# Patient Record
Sex: MALE | Race: WHITE | Employment: PART TIME | URBAN - METROPOLITAN AREA
[De-identification: names, ages, dates, MRNs, and addresses within clinical notes are randomized per-mention and may not be internally consistent; named-entity substitution may affect disease eponyms.]

---
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
## 2018-01-18 ENCOUNTER — OFFICE VISIT (OUTPATIENT)
Dept: FAMILY MEDICINE | Facility: CLINIC | Age: 18
End: 2018-01-18
Payer: COMMERCIAL

## 2018-01-18 VITALS
SYSTOLIC BLOOD PRESSURE: 113 MMHG | WEIGHT: 150 LBS | OXYGEN SATURATION: 100 % | TEMPERATURE: 98.9 F | BODY MASS INDEX: 17.36 KG/M2 | HEART RATE: 63 BPM | DIASTOLIC BLOOD PRESSURE: 73 MMHG | HEIGHT: 78 IN

## 2018-01-18 DIAGNOSIS — E55.9 VITAMIN D DEFICIENCY: ICD-10-CM

## 2018-01-18 DIAGNOSIS — Z00.00 ROUTINE GENERAL MEDICAL EXAMINATION AT A HEALTH CARE FACILITY: Primary | ICD-10-CM

## 2018-01-18 DIAGNOSIS — Z23 NEED FOR PROPHYLACTIC VACCINATION AND INOCULATION AGAINST INFLUENZA: ICD-10-CM

## 2018-01-18 DIAGNOSIS — E03.8 SUBCLINICAL HYPOTHYROIDISM: ICD-10-CM

## 2018-01-18 LAB
T4 FREE SERPL-MCNC: 1.22 NG/DL (ref 0.76–1.46)
TSH SERPL DL<=0.005 MIU/L-ACNC: 4.01 MU/L (ref 0.4–4)

## 2018-01-18 PROCEDURE — 84439 ASSAY OF FREE THYROXINE: CPT | Performed by: FAMILY MEDICINE

## 2018-01-18 PROCEDURE — 84443 ASSAY THYROID STIM HORMONE: CPT | Performed by: FAMILY MEDICINE

## 2018-01-18 PROCEDURE — 90686 IIV4 VACC NO PRSV 0.5 ML IM: CPT | Performed by: FAMILY MEDICINE

## 2018-01-18 PROCEDURE — 90471 IMMUNIZATION ADMIN: CPT | Performed by: FAMILY MEDICINE

## 2018-01-18 PROCEDURE — 36415 COLL VENOUS BLD VENIPUNCTURE: CPT | Performed by: FAMILY MEDICINE

## 2018-01-18 PROCEDURE — 99395 PREV VISIT EST AGE 18-39: CPT | Mod: 25 | Performed by: FAMILY MEDICINE

## 2018-01-18 PROCEDURE — 82306 VITAMIN D 25 HYDROXY: CPT | Performed by: FAMILY MEDICINE

## 2018-01-18 ASSESSMENT — ANXIETY QUESTIONNAIRES
3. WORRYING TOO MUCH ABOUT DIFFERENT THINGS: SEVERAL DAYS
1. FEELING NERVOUS, ANXIOUS, OR ON EDGE: SEVERAL DAYS
7. FEELING AFRAID AS IF SOMETHING AWFUL MIGHT HAPPEN: MORE THAN HALF THE DAYS
6. BECOMING EASILY ANNOYED OR IRRITABLE: MORE THAN HALF THE DAYS
5. BEING SO RESTLESS THAT IT IS HARD TO SIT STILL: SEVERAL DAYS
2. NOT BEING ABLE TO STOP OR CONTROL WORRYING: SEVERAL DAYS

## 2018-01-18 ASSESSMENT — PATIENT HEALTH QUESTIONNAIRE - PHQ9: SUM OF ALL RESPONSES TO PHQ QUESTIONS 1-9: 11

## 2018-01-18 NOTE — MR AVS SNAPSHOT
After Visit Summary   1/18/2018    Suraj Hawthorne    MRN: [id number]           Patient Information     Date Of Birth          2000        Visit Information        Provider Department      1/18/2018 3:00 PM Gopal Lombardo MD [facility]        Today's Diagnoses     Routine general medical examination at a health care facility    -  1    Vitamin D deficiency        Subclinical hypothyroidism        Need for prophylactic vaccination and inoculation against influenza          Care Instructions      Preventive Health Recommendations  Male Ages 18 - 25     Yearly exam:             See your health care provider every year in order to  o   Review health changes.   o   Discuss preventive care.    o   Review your medicines if your doctor has prescribed any.    You should be tested each year for STDs (sexually transmitted diseases).     Talk to your provider about cholesterol testing.      If you are at risk for diabetes, you should have a diabetes test (fasting glucose).    Shots: Get a flu shot each year. Get a tetanus shot every 10 years.     Nutrition:    Eat at least 5 servings of fruits and vegetables daily.     Eat whole-grain bread, whole-wheat pasta and brown rice instead of white grains and rice.     Talk to your provider about calcium and Vitamin D.     Lifestyle    Exercise for at least 150 minutes a week (30 minutes a day, 5 days a week). This will help you control your weight and prevent disease.     Limit alcohol to one drink per day.     No smoking.     Wear sunscreen to prevent skin cancer.     See your dentist every six months for an exam and cleaning.     Testicular Self-Exam (LIZETTE)  Testicular cancer is the most common form of cancer in men between the ages of 15 and 35. Most cases affect men under 55. It usually shows up as a painless lump in the testicle. The good news is that a simple monthly self-exam may help find trouble before it gets serious. When detected early,  testicular cancer is almost 100% curable.       Doing your LIZETTE  Do a LIZETTE once a month, during or after a warm shower. Spend about 3 minutes to 5 minutes feeling for lumps, firm areas, or changes. If you do find a problem, don t panic. Call your doctor and make an appointment.  Check the testicles  Hold your scrotum in the palm of your hand. Roll each testicle gently between the thumbs and fingers of both hands. Feel for changes in each testicle, one at a time.  Check the epididymis  The epididymis is a raised, rim-like structure responsible for sperm storage. It runs along the top and back of each testicle and often hurts when you press on it. Gently feel each epididymis for changes. A spermatocele, which is a cyst, can present as a painless growth near the testicle. These are noncancerous.  Check the vas deferens  The vas deferens is a little tube that runs up from the top of each testicle. A normal vas feels like a firm piece of cooked spaghetti. Feel for changes in the vas above each testicle.  Professional screening  If you feel any abnormalities, tell your doctor right away. In addition to doing your own LIZETTE, you should also see your doctor for regular checkups.  Date Last Reviewed: 1/1/2017 2000-2017 [employer]. [address] [place] [address]. All rights reserved. This information is not intended as a substitute for professional medical care. Always follow your healthcare professional's instructions.                Follow-ups after your visit        Follow-up notes from your care team     Return in about 1 year (around 1/18/2019) for Physical Exam.      Who to contact     If you have questions or need follow up information about today's clinic visit or your schedule please contact [facility] directly at [phone].  Normal or non-critical lab and imaging results will be communicated to you by MyChart, letter or phone within 4 business days after the clinic has  "received the results. If you do not hear from us within 7 days, please contact the clinic through [employer] or phone. If you have a critical or abnormal lab result, we will notify you by phone as soon as possible.  Submit refill requests through [employer] or call your pharmacy and they will forward the refill request to us. Please allow 3 business days for your refill to be completed.          Additional Information About Your Visit        [employer] Information     [employer] lets you send messages to your doctor, view your test results, renew your prescriptions, schedule appointments and more. To sign up, go to [email or web address] . Click on \"Log in\" on the left side of the screen, which will take you to the Welcome page. Then click on \"Sign up Now\" on the right side of the page.     You will be asked to enter the access code listed below, as well as some personal information. Please follow the directions to create your username and password.     Your access code is: [email or web address]  Expires: 2018 10:36 AM     Your access code will  in 90 days. If you need help or a new code, please call your [place] clinic or [phone].        Care EveryWhere ID     This is your Care EveryWhere ID. This could be used by other organizations to access your [place] medical records  [id number]        Your Vitals Were     Pulse Temperature Height Pulse Oximetry BMI (Body Mass Index)       63 98.9  F (37.2  C) (Oral) 6' 6\" (1.981 m) 100% 17.33 kg/m2        Blood Pressure from Last 3 Encounters:   18 113/73   17 112/76   17 106/65    Weight from Last 3 Encounters:   18 150 lb (68 kg) (45 %)*   17 153 lb (69.4 kg) (51 %)*   17 157 lb (71.2 kg) (61 %)*     * Growth percentiles are based on CDC 2-20 Years data.              We Performed the Following     25 Hydroxyvitamin D2 and D3     FLU VAC, SPLIT VIRUS IM > 3 YO (QUADRIVALENT) [71437]     TSH with free T4 reflex     Vaccine Administration, " Initial [71568]        Primary Care Provider Office Phone # Fax #    Gopal Lombardo [phone] [address] [facility] [address]        Equal Access to Services     ANGELA JEFFERS : Sonia richard sommero Soomaali, waaxda luqadaha, qaybta kaalmada adeegyada, chago baker laShainakailey butler. So [facility] [phone].    ATENCIÓN: Si habla español, tiene a flores disposición servicios gratuitos de asistencia lingüística. Llame al [phone].    We comply with applicable federal civil rights laws and [place] laws. We do not discriminate on the basis of race, color, national origin, age, disability, sex, sexual orientation, or gender identity.            Thank you!     Thank you for choosing [facility]  for your care. Our goal is always to provide you with excellent care. Hearing back from our patients is one way we can continue to improve our services. Please take a few minutes to complete the written survey that you may receive in the mail after your visit with us. Thank you!             Your Updated Medication List - Protect others around you: Learn how to safely use, store and throw away your medicines at www.disposemymeds.org.          This list is accurate as of: 1/18/18  4:15 PM.  Always use your most recent med list.                   Brand Name Dispense Instructions for use Diagnosis    celeXA 20 MG tablet   Generic drug:  citalopram      Take 10 mg by mouth daily        Cholecalciferol 4000 UNITS Caps       Vitamin D deficiency       guanFACINE 1 MG tablet    TENEX     Take 0.5 tablets by mouth 2 times daily.        INVEGA 6 MG 24 hr tablet   Generic drug:  paliperidone      Take 3 mg by mouth every morning 3 mg

## 2018-01-18 NOTE — PATIENT INSTRUCTIONS
Preventive Health Recommendations  Male Ages 18 - 25     Yearly exam:             See your health care provider every year in order to  o   Review health changes.   o   Discuss preventive care.    o   Review your medicines if your doctor has prescribed any.    You should be tested each year for STDs (sexually transmitted diseases).     Talk to your provider about cholesterol testing.      If you are at risk for diabetes, you should have a diabetes test (fasting glucose).    Shots: Get a flu shot each year. Get a tetanus shot every 10 years.     Nutrition:    Eat at least 5 servings of fruits and vegetables daily.     Eat whole-grain bread, whole-wheat pasta and brown rice instead of white grains and rice.     Talk to your provider about calcium and Vitamin D.     Lifestyle    Exercise for at least 150 minutes a week (30 minutes a day, 5 days a week). This will help you control your weight and prevent disease.     Limit alcohol to one drink per day.     No smoking.     Wear sunscreen to prevent skin cancer.     See your dentist every six months for an exam and cleaning.     Testicular Self-Exam (LIZETTE)  Testicular cancer is the most common form of cancer in men between the ages of 15 and 35. Most cases affect men under 55. It usually shows up as a painless lump in the testicle. The good news is that a simple monthly self-exam may help find trouble before it gets serious. When detected early, testicular cancer is almost 100% curable.       Doing your LIZETTE  Do a LIZETTE once a month, during or after a warm shower. Spend about 3 minutes to 5 minutes feeling for lumps, firm areas, or changes. If you do find a problem, don t panic. Call your doctor and make an appointment.  Check the testicles  Hold your scrotum in the palm of your hand. Roll each testicle gently between the thumbs and fingers of both hands. Feel for changes in each testicle, one at a time.  Check the epididymis  The epididymis is a raised, rim-like structure  responsible for sperm storage. It runs along the top and back of each testicle and often hurts when you press on it. Gently feel each epididymis for changes. A spermatocele, which is a cyst, can present as a painless growth near the testicle. These are noncancerous.  Check the vas deferens  The vas deferens is a little tube that runs up from the top of each testicle. A normal vas feels like a firm piece of cooked spaghetti. Feel for changes in the vas above each testicle.  Professional screening  If you feel any abnormalities, tell your doctor right away. In addition to doing your own LIZETTE, you should also see your doctor for regular checkups.  Date Last Reviewed: 1/1/2017 2000-2017 [employer]. [address] [place] [address], [place] [address]. All rights reserved. This information is not intended as a substitute for professional medical care. Always follow your healthcare professional's instructions.

## 2018-01-18 NOTE — PROGRESS NOTES

## 2018-01-18 NOTE — PROGRESS NOTES
"SUBJECTIVE:   CC: Suraj Hawthorne is an 17 year old male who presents for preventative health visit.     Physical   Annual:     Getting at least 3 servings of Calcium per day::  Yes    Bi-annual eye exam::  Yes    Dental care twice a year::  Yes    Sleep apnea or symptoms of sleep apnea::  None    Diet::  Regular (no restrictions)    Frequency of exercise::  4-5 days/week    Duration of exercise::  30-45 minutes    Taking medications regularly::  Yes    Medication side effects::  None    Additional concerns today::  No            He graduated from high school  In the spring of 2017.   He is at [employer] and taking classes.     He works out [employer] on a tread mill and does weights as well.   He is an a [employer].   243               Today's PHQ-2 Score: PHQ-2 ( 1999 Pfizer) 1/18/2018   Q1: Little interest or pleasure in doing things 0   Q2: Feeling down, depressed or hopeless 1   PHQ-2 Score 1   Q1: Little interest or pleasure in doing things Not at all   Q2: Feeling down, depressed or hopeless Several days   PHQ-2 Score 1       Abuse: Current or Past(Physical, Sexual or Emotional)- No  Do you feel safe in your environment - Yes    Social History   Substance Use Topics     Smoking status: Never Smoker     Smokeless tobacco: Never Used     Alcohol use No     Alcohol Use 1/18/2018   If you drink alcohol, do you typically have greater than 3 drinks per day OR greater than 7 drinks per week?   No   No flowsheet data found.      Last PSA: No results found for: PSA    Reviewed orders with patient. Reviewed health maintenance and updated orders accordingly -       Reviewed and updated as needed this visit by clinical staff  Tobacco  Allergies  Meds  Med Hx  Surg Hx  Fam Hx  Soc Hx        Reviewed and updated as needed this visit by Provider            Review of Systems      OBJECTIVE:   /73  Pulse 63  Temp 98.9  F (37.2  C) (Oral)  Ht 6' 6\" (1.981 m)  Wt 150 lb (68 kg)  SpO2 100%  BMI 17.33 " "kg/m2    Physical Exam      ASSESSMENT/PLAN:       ICD-10-CM    1. Routine general medical examination at a health care facility Z00.00        COUNSELING:   Reviewed preventive health counseling, as reflected in patient instructions       Regular exercise       Healthy diet/nutrition       Vision screening       Immunizations    Vaccinated for: Influenza           Safe sex practices/STD prevention       Osteoporosis Prevention/Bone Health               reports that he has never smoked. He has never used smokeless tobacco.      Estimated body mass index is 17.33 kg/(m^2) as calculated from the following:    Height as of this encounter: 6' 6\" (1.981 m).    Weight as of this encounter: 150 lb (68 kg).   Weight management plan noted, stable and monitoring    Counseling Resources:  ATP IV Guidelines  Pooled Cohorts Equation Calculator  FRAX Risk Assessment  ICSI Preventive Guidelines  Dietary Guidelines for Americans, 2010  [employer]'s MyPlate  ASA Prophylaxis  Lung CA Screening    Gopal Lombardo MD  [facility]  Answers for HPI/ROS submitted by the patient on 1/18/2018   PHQ-2 Score: 1  --------------------------------------------------------------------------------------------------------------------------------------    SUBJECTIVE:  Suraj Hawthorne is a 17 year old male who presents to the clinic today for a routine physical exam.    The patient's last physical was 1 years ago.     Cholesterol   Date Value Ref Range Status   04/08/2015 163 <170 mg/dL Final     Comment:     LDL Cholesterol is the primary guide to therapy.   The NCEP recommends further evaluation of: patients with cholesterol greater   than 200 mg/dL if additional risk factors are present, cholesterol greater   than   240 mg/dL, triglycerides greater than 150 mg/dL, or HDL less than 40 mg/dL.     04/09/2014 148 <170 mg/dL Final     Comment:     LDL Cholesterol is the primary guide to therapy.   The NCEP recommends further evaluation of: " patients with cholesterol greater   than 200 mg/dL if additional risk factors are present, cholesterol greater   than   240 mg/dL, triglycerides greater than 150 mg/dL, or HDL less than 40 mg/dL.     HDL Cholesterol   Date Value Ref Range Status   04/08/2015 67 >45 mg/dL Final   04/09/2014 51 >45 mg/dL Final     LDL Cholesterol Calculated   Date Value Ref Range Status   04/08/2015 83 0 - 129 mg/dL Final     Comment:     LDL Cholesterol is the primary guide to therapy: LDL-cholesterol goal in high   risk patients is <100 mg/dL and in very high risk patients is <70 mg/dL.     04/09/2014 82 0 - 129 mg/dL Final     Comment:     LDL Cholesterol is the primary guide to therapy: LDL-cholesterol goal in high   risk patients is <100 mg/dL and in very high risk patients is <70 mg/dL.     Triglycerides   Date Value Ref Range Status   04/08/2015 64 0 - 150 mg/dL Final   04/09/2014 70 0 - 150 mg/dL Final     Cholesterol/HDL Ratio   Date Value Ref Range Status   04/08/2015 2.4 0.0 - 5.0 Final   04/09/2014 2.9 0.0 - 5.0 Final     The patient's last fasting lipid panel was done 2.5 years ago and the results are listed above.      The ASCVD Risk score (Shell DC Jr, et al., 2013) failed to calculate for the following reasons:    The 2013 ASCVD risk score is only valid for ages 40 to 79        The patient reports that he has never been treated for high blood pressure.    The patient reports that he does not take a daily aspirin.    No results found for: HCVAB  The patient reports that he has not been screened for Hepatitis C    (Screen all baby boomers once per CDC-- the generation born from 1945 through 1965)    Immunization History   Administered Date(s) Administered     Comvax (HIB/HepB) 02/10/1999, 04/12/1999, 03/10/2000     DTAP (<7y) 02/10/1999, 04/12/1999, 06/14/1999, 03/10/2000, 08/26/2003     HEPA 10/29/2007, 10/31/2008     MMR 03/10/2000, 08/26/2003     Meningococcal (Menactra ) 12/08/2011, 01/12/2015     Pneumo Conj 13-V  (2010&after) 11/18/2010     Poliovirus, inactivated (IPV) 02/10/1999, 04/12/1999, 03/10/2000, 08/26/2003     TDAP Vaccine (Adacel) 11/06/2009     Varicella 03/10/2000, 10/29/2007     The patient's believes that his last tetanus shot was given 9 year(s) ago.   The patient believes that he has not had a Zostavax in the past  The patient believes that he has not had a PPSV23 in the past.  The patient believes that he has had a PCV13 in the past.  The patient believes that he has not had a seasonal flu vaccination this fall or winter.  The patient would like to have a Influenza      No results found for this or any previous visit.]   The patient reports a family history of colon cancer in his great uncle and great aunt.  The patient reports that he has not had a colonoscopy.     The patient reports that he does not performs a self testicular exam monthly.    The patient reports a family history of diabetes in his great grandfather. .    The patient reports that he eats or drinks 3 servings of dairy products per day.}  The patient reports that he has dental appointments approximately every 6 months.    Do you currently smoke? No  How many years have you smoked? 0   How many packs per day did you smoke on average? N/A  (if more than 30 pack year history and the patient is age 55-80 consider ordering an annual low dose radiation lung CT to screen for cancer)  (Do not order if patient has quit more than 15 years ago or has a health condition that limits life expectancy or could not tolerate curative lung surgery)  Are you interested having a lung CT to screen for lung cancer? N/A    If the patient has smoked more that 100 cigarettes, has the patient had an imaging study (US or CT) for an AAA between the ages of 65 and 75? N/A          Patient Active Problem List   Diagnosis     Pervasive developmental disorder     Generalized anxiety disorder     Vitamin D deficiency     Subclinical hypothyroidism       Past Surgical  "History:   Procedure Laterality Date     HERNIA REPAIR, INGUINAL RT/LT      L       Family History   Problem Relation Age of Onset     Allergies Mother      Depression Mother      Eye Disorder Mother      Thyroid Disease Mother      graves     Depression/Anxiety Mother      MARK     Thyroid Disease Mother      Graves     Asthma Mother      Allergies Maternal Grandmother      Arthritis Maternal Grandmother      Gynecology Maternal Grandmother      hyst     OSTEOPOROSIS Maternal Grandmother      Arthritis Paternal Grandmother      Depression Paternal Grandmother      Eye Disorder Paternal Grandmother      cataracts     Depression Paternal Grandfather      Thyroid Disease Paternal Grandfather      DIABETES Paternal Grandfather      Hypertension Paternal Grandfather        Social History     Social History     Marital status: Single     Spouse name: N/A     Number of children: N/A     Years of education: N/A     Occupational History     Not on file.     Social History Main Topics     Smoking status: Never Smoker     Smokeless tobacco: Never Used     Alcohol use No     Drug use: No     Sexual activity: No     Other Topics Concern     Not on file     Social History Narrative       Current Outpatient Prescriptions   Medication Sig Dispense Refill     Cholecalciferol 4000 UNITS CAPS        guanFACINE (TENEX) 1 MG tablet Take 0.5 tablets by mouth 2 times daily.       paliperidone (INVEGA) 6 MG 24 hr tablet Take 3 mg by mouth every morning 3 mg       citalopram (CELEXA) 20 MG tablet Take 10 mg by mouth daily            PHYSICAL EXAMINATION:  Blood pressure 113/73, pulse 63, temperature 98.9  F (37.2  C), temperature source Oral, height 6' 6\" (1.981 m), weight 150 lb (68 kg), SpO2 100 %.  General appearance - healthy, alert and no distress  Skin - Skin color, texture, turgor normal. No rashes or lesions.  Head - Normocephalic. No masses, lesions, tenderness or abnormalities  Eyes - conjunctivae/corneas clear. PERRL, EOM's " intact. Fundi benign  Ears - External ears normal. Canals clear. TM's normal.  Nose/Sinuses - Nares normal. Septum midline. Mucosa normal. No drainage or sinus tenderness.  Oropharynx - Lips, mucosa, and tongue normal. Teeth and gums normal.  Neck - Neck supple. No adenopathy. Thyroid symmetric, normal size,  Lungs - Percussion normal. Good diaphragmatic excursion. Lungs clear  Heart - PMI normal. No lifts, heaves, or thrills. RRR. No murmurs, clicks gallops or rub  Abdomen - Abdomen soft, non-tender. BS normal. No masses, organomegaly  Extremities - Extremities normal. No deformities, edema, or skin discoloration.  Musculoskeletal - Spine ROM normal. Muscular strength intact.  Peripheral pulses - radial=4/4, femoral=4/4, popliteal=4/4, dorsalis pedis=4/4,  Neuro - Gait normal. Reflexes normal and symmetric. Sensation grossly WNL.  Genitalia - Penis normal. No urethral discharge. Scrotum normal to palpation. No hernia.  Rectal - deferred      Office Visit on 01/16/2017   Component Date Value Ref Range Status     YOUTH PEDIATRIC SYMPTOM CHECK LIST* 01/16/2017 41   Final     25 OH Vit D2 01/16/2017 <5  ug/L Final     25 OH Vit D3 01/16/2017 105  ug/L Final     25 OH Vit D total 01/16/2017 * 20 - 75 ug/L Final                    Value:<110  Season, race, dietary intake, and treatment affect the concentration of   25-hydroxy-Vitamin D. Values may decrease during winter months and increase   during summer months. Values 20-29 ug/L may indicate Vitamin D insufficiency   and values <20 ug/L may indicate Vitamin D deficiency.   This test was developed and its performance characteristics determined by the   [facility],  Special [facility] Laboratory. It has   not been cleared or approved by the FDA. The laboratory is regulated under CLIA   as qualified to perform high-complexity testing. This test is used for clinical   purposes. It should not be regarded as investigational or for research.        TSH 01/16/2017 2.10  0.40 - 4.00 mU/L Final       ASSESSMENT:    ICD-10-CM    1. Routine general medical examination at a health care facility Z00.00        Well-Adult Physical Exam.  Health Maintenance Due   Topic Date Due     HPV IMMUNIZATION (2 of 3 - Male 3 Dose Series) 03/13/2017     INFLUENZA VACCINE (SYSTEM ASSIGNED)  09/01/2017     TSH Q1 YEAR  01/16/2018     Health Maintenance   Topic Date Due     HPV IMMUNIZATION (2 of 3 - Male 3 Dose Series) 03/13/2017     INFLUENZA VACCINE (SYSTEM ASSIGNED)  09/01/2017     TSH Q1 YEAR  01/16/2018     TETANUS IMMUNIZATION (SYSTEM ASSIGNED)  11/06/2019     PEDS DTAP/TDAP (7 - Td) 11/06/2019         HEALTH CARE MAINTENENCE: The recommended screening tests and vaccinatons for this patient have been discussed as above.  The appropriate tests and vaccinations  have been ordered or declined by the patient. Please see the orders in EPIC.The patient specifically declines: n/a    Immunization Status:  up to date and documented except for influenza     Patient Active Problem List   Diagnosis     Pervasive developmental disorder     Generalized anxiety disorder     Vitamin D deficiency     Subclinical hypothyroidism        ATP III Guidelines  ICSI Preventive Guidelines    PLAN:   Flu shot recommended  Testicular self-exam encouraged  Discussed calcium intake, vitamins and supplements. Recommended 1000 mg of calcium daily  Sunscreen use was recommended especially in the area of tatoos  Recommended dental exams every 6 months  Follow up in 1 year for the next preventative medical visit        Body mass index is 17.33 kg/(m^2).    We will recheck the vitamin D and the TSH today     (Z00.00) Routine general medical examination at a health care facility  (primary encounter diagnosis)    (E55.9) Vitamin D deficiency  Comment:   Plan: 25 Hydroxyvitamin D2 and D3            (E03.9) Subclinical hypothyroidism  Comment:   Plan: TSH with free T4 reflex            (Z23) Need for prophylactic  vaccination and inoculation against influenza  Comment:   Plan: FLU VAC, SPLIT VIRUS IM > 3 YO (QUADRIVALENT)         [27932], Vaccine Administration, Initial         [[id number]]

## 2018-01-18 NOTE — NURSING NOTE
"Chief Complaint   Patient presents with     Physical       Initial /73  Pulse 63  Temp 98.9  F (37.2  C) (Oral)  Ht 6' 6\" (1.981 m)  Wt 150 lb (68 kg)  SpO2 100%  BMI 17.33 kg/m2 Estimated body mass index is 17.33 kg/(m^2) as calculated from the following:    Height as of this encounter: 6' 6\" (1.981 m).    Weight as of this encounter: 150 lb (68 kg).  Medication Reconciliation: complete     Dora Almeida cma    "

## 2018-01-25 ENCOUNTER — TELEPHONE (OUTPATIENT)
Dept: FAMILY MEDICINE | Facility: CLINIC | Age: 18
End: 2018-01-25

## 2018-01-25 DIAGNOSIS — E55.9 VITAMIN D DEFICIENCY: Primary | ICD-10-CM

## 2018-01-25 LAB
DEPRECATED CALCIDIOL+CALCIFEROL SERPL-MC: <90 UG/L (ref 20–75)
VITAMIN D2 SERPL-MCNC: <5 UG/L
VITAMIN D3 SERPL-MCNC: 85 UG/L

## 2018-01-25 RX ORDER — CHOLECALCIFEROL (VITAMIN D3) 50 MCG
2000 TABLET ORAL DAILY
COMMUNITY
Start: 2018-01-25 | End: 2018-02-26

## 2018-01-26 NOTE — TELEPHONE ENCOUNTER
Patient/parent is informed of MD note below, as it is written. Verbalized good understanding.  Appointment for repeat lab is schedule for 2/20/18.  Susan Canchola RN

## 2018-01-26 NOTE — TELEPHONE ENCOUNTER
Please call the mother of the patient. Suraj had a developmental disorder. His Vitamin D is too high so I would recommend(ed) that we go down to 2,000 iu daily and recheck in about a month(s).  Gopal Lombardo MD

## 2018-02-20 DIAGNOSIS — E55.9 VITAMIN D DEFICIENCY: ICD-10-CM

## 2018-02-20 PROCEDURE — 82306 VITAMIN D 25 HYDROXY: CPT | Performed by: FAMILY MEDICINE

## 2018-02-20 PROCEDURE — 36415 COLL VENOUS BLD VENIPUNCTURE: CPT | Performed by: FAMILY MEDICINE

## 2018-02-26 ENCOUNTER — TELEPHONE (OUTPATIENT)
Dept: FAMILY MEDICINE | Facility: CLINIC | Age: 18
End: 2018-02-26

## 2018-02-26 DIAGNOSIS — E55.9 VITAMIN D DEFICIENCY: Primary | ICD-10-CM

## 2018-02-26 LAB
DEPRECATED CALCIDIOL+CALCIFEROL SERPL-MC: <85 UG/L (ref 20–75)
VITAMIN D2 SERPL-MCNC: <5 UG/L
VITAMIN D3 SERPL-MCNC: 80 UG/L

## 2018-02-26 NOTE — TELEPHONE ENCOUNTER
Reason for Call:  Form, our goal is to have forms completed with 72 hours, however, some forms may require a visit or additional information.    Type of letter, form or note:  Athlete Physical Exam    Who is the form from?: Patient    Where did the form come from: Patient or family brought in       What clinic location was the form placed at?: [place]    Where the form was placed: 's Box    What number is listed as a contact on the form?: [phone]       Additional comments:  Please mail back to Pt / Envelope is enclosed    Call taken on 2/26/2018 at 1:36 PM by Yesi Ding

## 2018-02-26 NOTE — TELEPHONE ENCOUNTER
Please call the patient's mother and let her know that the Vitamin D was still too high.   Decrease the dose to 1,000 IU per day(s) and recheck the level in 1 month(s).  Gopal Lombardo MD

## 2018-02-26 NOTE — TELEPHONE ENCOUNTER
Left message on answering machine for patient/parent to call back.   [phone].  Susan Canchola RN

## 2018-02-26 NOTE — TELEPHONE ENCOUNTER
Patient/parent is informed of MD note below, as it is written. Verbalized good understanding.  Follow up lab appointment 3/25/18.  Susan Canchola RN

## 2018-03-26 DIAGNOSIS — E55.9 VITAMIN D DEFICIENCY: ICD-10-CM

## 2018-03-26 PROCEDURE — 82306 VITAMIN D 25 HYDROXY: CPT | Performed by: FAMILY MEDICINE

## 2018-03-26 PROCEDURE — 36415 COLL VENOUS BLD VENIPUNCTURE: CPT | Performed by: FAMILY MEDICINE

## 2018-03-28 ENCOUNTER — TELEPHONE (OUTPATIENT)
Dept: FAMILY MEDICINE | Facility: CLINIC | Age: 18
End: 2018-03-28

## 2018-03-28 NOTE — TELEPHONE ENCOUNTER
Reason for Call:  Form, our goal is to have forms completed with 72 hours, however, some forms may require a visit or additional information.    Type of letter, form or note:  medical    Who is the form from?: Patient    Where did the form come from: Patient or family brought in       What clinic location was the form placed at?: [place]    Where the form was placed: 's Box    What number is listed as a contact on the form?: [phone]       Additional comments: MAIL completed FORM in the envelope provided  Call taken on 3/28/2018 at 2:46 PM by Ana Mckeon

## 2018-03-30 LAB
DEPRECATED CALCIDIOL+CALCIFEROL SERPL-MC: <74 UG/L (ref 20–75)
VITAMIN D2 SERPL-MCNC: <5 UG/L
VITAMIN D3 SERPL-MCNC: 69 UG/L

## 2018-03-30 NOTE — PROGRESS NOTES
Suraj,  I have reviewed the results of the laboratory tests that we recently ordered. All of the lab work performed was normal or considered normal for you. Keep taking the same dose of the Vitamin D.   Sincerely,   Gopal Lombardo

## 2018-10-02 ENCOUNTER — MYC MEDICAL ADVICE (OUTPATIENT)
Dept: FAMILY MEDICINE | Facility: CLINIC | Age: 18
End: 2018-10-02

## 2019-01-29 NOTE — PATIENT INSTRUCTIONS
Preventive Health Recommendations  Male Ages 18 - 20     Yearly exam:             See your health care provider every year in order to  o   Review health changes.   o   Discuss preventive care.    o   Review your medicines if your doctor has prescribed any.    You should be tested each year for STDs (sexually transmitted diseases).     Talk to your provider about cholesterol testing.      If you are at risk for diabetes, you should have a diabetes test (fasting glucose).    Shots: Get a flu shot each year. Get a tetanus shot every 10 years.     Nutrition:    Eat at least 5 servings of fruits and vegetables daily.     Eat whole-grain bread, whole-wheat pasta and brown rice instead of white grains and rice.     Get adequate calcium and Vitamin D.     Lifestyle    Exercise for at least 150 minutes a week (30 minutes a day, 5 days a week). This will help you control your weight and prevent disease.     No smoking.     Wear sunscreen to prevent skin cancer.     See your dentist every six months for an exam and cleaning.             Start taking miralax 1/2 capful a day(s)   If that causes loose stools go to 1/4 capful a day(s)   If not having a bowel movement at least every 3rd day increase to a full capful a day(s)     Patient Education     Preventing Osteoporosis: Meeting Your Calcium Needs    Your body needs calcium to build and repair bones. But it can't make calcium on its own. That's why it's important to eat calcium-rich foods. Some foods are naturally rich in calcium. Others have calcium added (fortified). It's best to get calcium from the foods you eat. But if you can't get enough, you may want to take calcium supplements. To meet your daily calcium needs, try the foods listed below.  Dairy Fish & beans Other sources   Source   Calcium (mg) per serving   Source   Calcium (mg) per serving   Source   Calcium (mg) per serving   Low-fat yogurt, plain   415 mg/8 oz.   Sardines, Atlantic, canned, with bones   351  mg/3 oz.   Oatmeal, instant, fortified   215 mg/1 cup   Nonfat milk   302 mg/1 cup   [place], sockeye, canned, with bones   239 mg/3 oz.   Tofu made with calcium sulfate   204 mg/3 oz.   Low-fat milk   297 mg/1 cup   Soybeans, fresh, boiled   131 mg/1/2 cup   Collards   179 mg/1/2 cup   Swiss cheese   272 mg/1 oz.   White beans, cooked   81 mg/1/2 cup   English muffin, whole wheat   175 mg/1 muffin   Cheddar cheese   205 mg/1 oz.   Navy beans, cooked   79 mg/1/2 cup   Kale   90 mg/1/2 cup   Ice cream strawberry   79 mg/1/2 cup           Orange, navel   56 mg/1 medium   Note: Calcium levels may vary depending on brand and size.  Daily calcium needs  14 to 18 years old: 1,300 mg  19 to 30 years old: 1,000 mg  31 to 50 years old: 1,000 mg  51 to 70 years old, women: 1,200 mg  51 to 70 years old, men: 1,000 mg  Pregnant or nursin to 18 years old: 1,300 mg, 19 to 50 years old: 1,000 mg  Older than 70 (women and men): 1,200 mg   Date Last Reviewed: 2018-2018 [employer]. [address] [place] [address]. All rights reserved. This information is not intended as a substitute for professional medical care. Always follow your healthcare professional's instructions.

## 2019-02-01 ENCOUNTER — OFFICE VISIT (OUTPATIENT)
Dept: FAMILY MEDICINE | Facility: CLINIC | Age: 19
End: 2019-02-01
Payer: COMMERCIAL

## 2019-02-01 VITALS
HEART RATE: 64 BPM | RESPIRATION RATE: 20 BRPM | OXYGEN SATURATION: 100 % | TEMPERATURE: 98.7 F | SYSTOLIC BLOOD PRESSURE: 120 MMHG | HEIGHT: 78 IN | DIASTOLIC BLOOD PRESSURE: 73 MMHG | BODY MASS INDEX: 19.44 KG/M2 | WEIGHT: 168 LBS

## 2019-02-01 DIAGNOSIS — Z00.00 ROUTINE GENERAL MEDICAL EXAMINATION AT A HEALTH CARE FACILITY: Primary | ICD-10-CM

## 2019-02-01 DIAGNOSIS — Z11.4 SCREENING FOR HIV (HUMAN IMMUNODEFICIENCY VIRUS): ICD-10-CM

## 2019-02-01 DIAGNOSIS — E55.9 VITAMIN D DEFICIENCY: ICD-10-CM

## 2019-02-01 DIAGNOSIS — R79.89 PROLACTIN INCREASED: ICD-10-CM

## 2019-02-01 DIAGNOSIS — Z23 NEED FOR PROPHYLACTIC VACCINATION AND INOCULATION AGAINST INFLUENZA: ICD-10-CM

## 2019-02-01 DIAGNOSIS — R94.6 ABNORMAL FINDING ON THYROID FUNCTION TEST: ICD-10-CM

## 2019-02-01 DIAGNOSIS — Z23 NEED FOR VACCINATION: ICD-10-CM

## 2019-02-01 DIAGNOSIS — K59.03 DRUG-INDUCED CONSTIPATION: ICD-10-CM

## 2019-02-01 LAB
PROLACTIN SERPL-MCNC: 18 UG/L (ref 2–18)
T4 FREE SERPL-MCNC: 1.31 NG/DL (ref 0.76–1.46)
TSH SERPL DL<=0.005 MIU/L-ACNC: 5.89 MU/L (ref 0.4–4)

## 2019-02-01 PROCEDURE — 90472 IMMUNIZATION ADMIN EACH ADD: CPT | Performed by: FAMILY MEDICINE

## 2019-02-01 PROCEDURE — 36415 COLL VENOUS BLD VENIPUNCTURE: CPT | Performed by: FAMILY MEDICINE

## 2019-02-01 PROCEDURE — 84439 ASSAY OF FREE THYROXINE: CPT | Performed by: FAMILY MEDICINE

## 2019-02-01 PROCEDURE — 90715 TDAP VACCINE 7 YRS/> IM: CPT | Performed by: FAMILY MEDICINE

## 2019-02-01 PROCEDURE — 90686 IIV4 VACC NO PRSV 0.5 ML IM: CPT | Performed by: FAMILY MEDICINE

## 2019-02-01 PROCEDURE — 84443 ASSAY THYROID STIM HORMONE: CPT | Performed by: FAMILY MEDICINE

## 2019-02-01 PROCEDURE — 99395 PREV VISIT EST AGE 18-39: CPT | Mod: 25 | Performed by: FAMILY MEDICINE

## 2019-02-01 PROCEDURE — 82306 VITAMIN D 25 HYDROXY: CPT | Performed by: FAMILY MEDICINE

## 2019-02-01 PROCEDURE — 84146 ASSAY OF PROLACTIN: CPT | Performed by: FAMILY MEDICINE

## 2019-02-01 PROCEDURE — 90471 IMMUNIZATION ADMIN: CPT | Performed by: FAMILY MEDICINE

## 2019-02-01 RX ORDER — RISPERIDONE 1 MG/1
1.5 TABLET ORAL
Refills: 5 | COMMUNITY
Start: 2019-01-11 | End: 2022-11-28

## 2019-02-01 ASSESSMENT — ENCOUNTER SYMPTOMS
SORE THROAT: 0
WEAKNESS: 0
ABDOMINAL PAIN: 0
COUGH: 0
PARESTHESIAS: 0
NERVOUS/ANXIOUS: 1
FREQUENCY: 0
MYALGIAS: 0
NAUSEA: 0
FEVER: 0
ARTHRALGIAS: 0
PALPITATIONS: 0
HEADACHES: 0
DIZZINESS: 0
DIARRHEA: 0
HEARTBURN: 0
DYSURIA: 0
EYE PAIN: 0
HEMATOCHEZIA: 0
CONSTIPATION: 1
SHORTNESS OF BREATH: 0
CHILLS: 0
HEMATURIA: 0
JOINT SWELLING: 0

## 2019-02-01 ASSESSMENT — PATIENT HEALTH QUESTIONNAIRE - PHQ9
5. POOR APPETITE OR OVEREATING: NOT AT ALL
SUM OF ALL RESPONSES TO PHQ QUESTIONS 1-9: 4

## 2019-02-01 ASSESSMENT — ANXIETY QUESTIONNAIRES
GAD7 TOTAL SCORE: 5
3. WORRYING TOO MUCH ABOUT DIFFERENT THINGS: SEVERAL DAYS
6. BECOMING EASILY ANNOYED OR IRRITABLE: SEVERAL DAYS
7. FEELING AFRAID AS IF SOMETHING AWFUL MIGHT HAPPEN: SEVERAL DAYS
1. FEELING NERVOUS, ANXIOUS, OR ON EDGE: SEVERAL DAYS
5. BEING SO RESTLESS THAT IT IS HARD TO SIT STILL: NOT AT ALL
IF YOU CHECKED OFF ANY PROBLEMS ON THIS QUESTIONNAIRE, HOW DIFFICULT HAVE THESE PROBLEMS MADE IT FOR YOU TO DO YOUR WORK, TAKE CARE OF THINGS AT HOME, OR GET ALONG WITH OTHER PEOPLE: SOMEWHAT DIFFICULT
2. NOT BEING ABLE TO STOP OR CONTROL WORRYING: SEVERAL DAYS

## 2019-02-01 ASSESSMENT — PAIN SCALES - GENERAL: PAINLEVEL: NO PAIN (0)

## 2019-02-01 ASSESSMENT — MIFFLIN-ST. JEOR: SCORE: 1897.35

## 2019-02-01 NOTE — NURSING NOTE
"Chief Complaint   Patient presents with     Physical     Health Maintenance     order pended, immunization       Initial /73   Pulse 64   Temp 98.7  F (37.1  C) (Oral)   Resp 20   Ht 1.969 m (6' 5.5\")   Wt 76.2 kg (168 lb)   SpO2 100%   BMI 19.67 kg/m   Estimated body mass index is 19.67 kg/m  as calculated from the following:    Height as of this encounter: 1.969 m (6' 5.5\").    Weight as of this encounter: 76.2 kg (168 lb).  Medication Reconciliation: complete  Dora Almeida CMA  "

## 2019-02-01 NOTE — PROGRESS NOTES
SUBJECTIVE:   CC: Suraj Hawthorne is an 18 year old male who presents for preventative health visit.     Physical   Annual:     Getting at least 3 servings of Calcium per day:  Yes    Bi-annual eye exam:  Yes    Dental care twice a year:  Yes    Sleep apnea or symptoms of sleep apnea:  Daytime drowsiness    Diet:  Regular (no restrictions)    Frequency of exercise:  1 day/week    Duration of exercise:  15-30 minutes    Taking medications regularly:  Yes    Medication side effects:  None    Additional concerns today:  No    PHQ-2 Total Score: 2              Today's PHQ-2 Score:   PHQ-2 ( 1999 Pfizer) 2/1/2019   Q1: Little interest or pleasure in doing things 1   Q2: Feeling down, depressed or hopeless 1   PHQ-2 Score 2   Q1: Little interest or pleasure in doing things Several days   Q2: Feeling down, depressed or hopeless Several days   PHQ-2 Score 2   He does see psychiatry(ist) at least once a year and saw Dr schofield before [place].       Abuse: Current or Past(Physical, Sexual or Emotional)- No  Do you feel safe in your environment? Yes    Social History     Tobacco Use     Smoking status: Never Smoker     Smokeless tobacco: Never Used   Substance Use Topics     Alcohol use: No     Alcohol Use 2/1/2019   If you drink alcohol do you typically have greater than 3 drinks per day OR greater than 7 drinks per week? Not Applicable       Last PSA: No results found for: PSA    Reviewed orders with patient. Reviewed health maintenance and updated orders accordingly -       Reviewed and updated as needed this visit by clinical staff  Tobacco  Allergies  Meds  Med Hx  Surg Hx  Fam Hx  Soc Hx        Reviewed and updated as needed this visit by Provider            Review of Systems   Constitutional: Negative for chills and fever.   HENT: Negative for congestion, ear pain, hearing loss and sore throat.    Eyes: Negative for pain and visual disturbance.   Respiratory: Negative for cough and shortness of breath.   "  Cardiovascular: Negative for chest pain, palpitations and peripheral edema.   Gastrointestinal: Positive for constipation. Negative for abdominal pain, diarrhea, heartburn, hematochezia and nausea.   Genitourinary: Negative for discharge, dysuria, frequency, genital sores, hematuria, impotence and urgency.   Musculoskeletal: Negative for arthralgias, joint swelling and myalgias.   Skin: Negative for rash.   Neurological: Negative for dizziness, weakness, headaches and paresthesias.   Psychiatric/Behavioral: Positive for mood changes. The patient is nervous/anxious.          OBJECTIVE:   /73   Pulse 64   Temp 98.7  F (37.1  C) (Oral)   Resp 20   Ht 1.969 m (6' 5.5\")   Wt 76.2 kg (168 lb)   SpO2 100%   BMI 19.67 kg/m      Physical Exam      Diagnostic Test Results:  none     ASSESSMENT/PLAN:       ICD-10-CM    1. Routine general medical examination at a health care facility Z00.00    2. Screening for HIV (human immunodeficiency virus) Z11.4    3. Need for vaccination Z23 1st  Administration  [60827]   4. Need for prophylactic vaccination and inoculation against influenza Z23 Each additional admin.  (Right click and add QUANTITY)  [99516]     FLU VACCINE, SPLIT VIRUS, IM (QUADRIVALENT) [83892]- >3 YRS   5. Abnormal finding on thyroid function test R94.6 TSH WITH FREE T4 REFLEX   6. Vitamin D deficiency E55.9 25 Hydroxyvitamin D2 and D3   7. Prolactin increased (H) E22.9 Prolactin   8. Drug-induced constipation K59.03        COUNSELING:   Reviewed preventive health counseling, as reflected in patient instructions       Regular exercise       Vision screening       Osteoporosis Prevention/Bone Health    BP Readings from Last 1 Encounters:   02/01/19 120/73     Estimated body mass index is 19.67 kg/m  as calculated from the following:    Height as of this encounter: 1.969 m (6' 5.5\").    Weight as of this encounter: 76.2 kg (168 lb).    BP Screening:   Last 3 BP Readings:    BP Readings from Last 3 " Encounters:   02/01/19 120/73   01/18/18 113/73 (10 %/ 43 %)*   11/27/17 112/76     *BP percentiles are based on the August 2017 AAP Clinical Practice Guideline for boys       The following was recommended to the patient:  Re-screen BP within a year and recommended lifestyle modifications       reports that  has never smoked. he has never used smokeless tobacco.      Counseling Resources:  ATP IV Guidelines  Pooled Cohorts Equation Calculator  FRAX Risk Assessment  ICSI Preventive Guidelines  Dietary Guidelines for Americans, 2010  [employer]'s MyPlate  ASA Prophylaxis  Lung CA Screening    Gopal Lombardo MD  [facility]  Injectable Influenza Immunization Documentation    1.  Is the person to be vaccinated sick today?   No    2. Does the person to be vaccinated have an allergy to a component   of the vaccine?   No  Egg Allergy Algorithm Link    3. Has the person to be vaccinated ever had a serious reaction   to influenza vaccine in the past?   No    4. Has the person to be vaccinated ever had Guillain-Barré syndrome?   No    Form completed by Dora Almeida cma      Screening Questionnaire for Adult Immunization    Are you sick today?   No   Do you have allergies to medications, food, a vaccine component or latex?   No   Have you ever had a serious reaction after receiving a vaccination?   No   Do you have a long-term health problem with heart disease, lung disease, asthma, kidney disease, metabolic disease (e.g. diabetes), anemia, or other blood disorder?   No   Do you have cancer, leukemia, HIV/AIDS, or any other immune system problem?   No   In the past 3 months, have you taken medications that affect  your immune system, such as prednisone, other steroids, or anticancer drugs; drugs for the treatment of rheumatoid arthritis, Crohn s disease, or psoriasis; or have you had radiation treatments?   No   Have you had a seizure, or a brain or other nervous system problem?   No   During the past year,  have you received a transfusion of blood or blood     products, or been given immune (gamma) globulin or antiviral drug?   No   For women: Are you pregnant or is there a chance you could become        pregnant during the next month?   No   Have you received any vaccinations in the past 4 weeks?   No     Immunization questionnaire answers were all negative.        Per orders of Dr. Lombardo, injection of Tdap, Flu given by Dora Almeida. Patient instructed to remain in clinic for 15 minutes afterwards, and to report any adverse reaction to me immediately.       Screening performed by Dora Almeida on 2/1/2019 at 3:45 PM.    Prior to injection, verified patient identity using patient's name and date of birth.  Due to injection administration, patient instructed to remain in clinic for 15 minutes  afterwards, and to report any adverse reaction to me immediately.    --------------------------------------------------------------------------------------------------------------------------------------  SUBJECTIVE:  Suraj Hawthorne is a 18 year old male who presents to the clinic today for a routine physical exam.    The patient's last physical was 1 years ago.     Cholesterol   Date Value Ref Range Status   04/08/2015 163 <170 mg/dL Final     Comment:     LDL Cholesterol is the primary guide to therapy.   The NCEP recommends further evaluation of: patients with cholesterol greater   than 200 mg/dL if additional risk factors are present, cholesterol greater   than   240 mg/dL, triglycerides greater than 150 mg/dL, or HDL less than 40 mg/dL.     04/09/2014 148 <170 mg/dL Final     Comment:     LDL Cholesterol is the primary guide to therapy.   The NCEP recommends further evaluation of: patients with cholesterol greater   than 200 mg/dL if additional risk factors are present, cholesterol greater   than   240 mg/dL, triglycerides greater than 150 mg/dL, or HDL less than 40 mg/dL.     HDL Cholesterol   Date Value Ref  Range Status   04/08/2015 67 >45 mg/dL Final   04/09/2014 51 >45 mg/dL Final     LDL Cholesterol Calculated   Date Value Ref Range Status   04/08/2015 83 0 - 129 mg/dL Final     Comment:     LDL Cholesterol is the primary guide to therapy: LDL-cholesterol goal in high   risk patients is <100 mg/dL and in very high risk patients is <70 mg/dL.     04/09/2014 82 0 - 129 mg/dL Final     Comment:     LDL Cholesterol is the primary guide to therapy: LDL-cholesterol goal in high   risk patients is <100 mg/dL and in very high risk patients is <70 mg/dL.     Triglycerides   Date Value Ref Range Status   04/08/2015 64 0 - 150 mg/dL Final   04/09/2014 70 0 - 150 mg/dL Final     Cholesterol/HDL Ratio   Date Value Ref Range Status   04/08/2015 2.4 0.0 - 5.0 Final   04/09/2014 2.9 0.0 - 5.0 Final     The patient's last fasting lipid panel was done 4 years ago and the results are listed above.        The ASCVD Risk score ([place] JESSICA Jr., et al., 2013) failed to calculate for the following reasons:    The 2013 ASCVD risk score is only valid for ages 40 to 79        The patient reports that he has never been treated for high blood pressure.    The patient reports that he does not take a daily aspirin.    No results found for: HCVAB  The patient reports that he has not been screened for Hepatitis C    (Screen all baby boomers once per CDC-- the generation born from 1945 through 1965)  He would not like to have an Hepatitis C test today    No results found for: HIAGAB  The patient reports that he has not been screened for HIV   (Screen all 15 to 64 years old)  He would not like to have an HIV test today      Immunization History   Administered Date(s) Administered     Comvax (HIB/HepB) 02/10/1999, 04/12/1999, 03/10/2000     DTAP (<7y) 02/10/1999, 04/12/1999, 06/14/1999, 03/10/2000, 08/26/2003     HEPA 10/29/2007, 10/31/2008     Influenza Vaccine IM 3yrs+ 4 Valent IIV4 01/18/2018, 02/01/2019     MMR 03/10/2000, 08/26/2003      Meningococcal (Menactra ) 12/08/2011, 01/12/2015     Pneumo Conj 13-V (2010&after) 11/18/2010     Poliovirus, inactivated (IPV) 02/10/1999, 04/12/1999, 03/10/2000, 08/26/2003     TDAP Vaccine (Adacel) 11/06/2009, 02/01/2019     Varicella 03/10/2000, 10/29/2007     The patient's believes that his last tetanus shot was given 10 year(s) ago.   The patient believes that he has not had a Shingrix in the past  The patient believes that he has not had a PPSV23 in the past.  The patient believes that he has not had a PCV13 in the past.  The patient believes that he has not had a seasonal flu vaccination this fall or winter.  The patient would like to have a Influenza and Td      No results found for this or any previous visit.]   The patient reports a family history of colon cancer in his great aunts and uncles.  The patient reports that he has not had a colonoscopy.    The patient reports that he does not performs a self testicular exam monthly.  His currently used contraception is n/a. He is not sexually active.   The patient reports a family history of diabetes in his great great grandfather.  The patient reports that he eats or drinks 1-2 servings of dairy products per day. He does not take a calcium supplement at all.  The patient reports that he has dental appointments approximately every 6 months.  The patient reports that he  has an eye examination approximately every 1.0 year(s).    Do you currently smoke? No  How many years have you smoked? 0   How many packs per day did you smoke on average? N/A  (if more than 30 pack year history and the patient is age 55-80 consider ordering an annual low dose radiation lung CT to screen for cancer)  (Do not order if patient has quit more than 15 years ago or has a health condition that limits life expectancy or could not tolerate curative lung surgery)  Are you interested having a lung CT to screen for lung cancer? N/A    If the patient has smoked more that 100 cigarettes, has  the patient had an imaging study (US or CT) for an AAA between the ages of 65 and 75? N/A              Patient Active Problem List   Diagnosis     Pervasive developmental disorder     Generalized anxiety disorder     Vitamin D deficiency     Subclinical hypothyroidism       Past Surgical History:   Procedure Laterality Date     HERNIA REPAIR, INGUINAL RT/LT      L       Family History   Problem Relation Age of Onset     Allergies Mother      Depression Mother      Eye Disorder Mother      Thyroid Disease Mother         graves     Depression/Anxiety Mother         MARK     Thyroid Disease Mother         Graves     Asthma Mother      Allergies Maternal Grandmother      Arthritis Maternal Grandmother      Gynecology Maternal Grandmother         hyst     Osteoporosis Maternal Grandmother      Arthritis Paternal Grandmother      Depression Paternal Grandmother      Eye Disorder Paternal Grandmother         cataracts     Depression Paternal Grandfather      Thyroid Disease Paternal Grandfather      Diabetes Paternal Grandfather      Hypertension Paternal Grandfather        Social History     Socioeconomic History     Marital status: Single     Spouse name: Not on file     Number of children: Not on file     Years of education: Not on file     Highest education level: Not on file   Social Needs     Financial resource strain: Not on file     Food insecurity - worry: Not on file     Food insecurity - inability: Not on file     Transportation needs - medical: Not on file     Transportation needs - non-medical: Not on file   Occupational History     Not on file   Tobacco Use     Smoking status: Never Smoker     Smokeless tobacco: Never Used   Substance and Sexual Activity     Alcohol use: No     Drug use: No     Sexual activity: No   Other Topics Concern     Parent/sibling w/ CABG, MI or angioplasty before 65F 55M? Not Asked   Social History Narrative     Not on file       Current Outpatient Medications   Medication Sig  "Dispense Refill     cholecalciferol (VITAMIN D3) 1000 UNIT tablet Take 1 tablet (1,000 Units) by mouth daily       citalopram (CELEXA) 20 MG tablet Take 10 mg by mouth daily        guanFACINE (TENEX) 1 MG tablet Take 0.5 tablets by mouth 2 times daily.       risperiDONE (RISPERDAL) 1 MG tablet 1.5 mg  5           PHYSICAL EXAMINATION:  Blood pressure 120/73, pulse 64, temperature 98.7  F (37.1  C), temperature source Oral, resp. rate 20, height 1.969 m (6' 5.5\"), weight 76.2 kg (168 lb), SpO2 100 %.  General appearance - healthy, alert and no distress  Skin - Skin color, texture, turgor normal. No rashes or lesions.  Head - Normocephalic. No masses, lesions, tenderness or abnormalities  Eyes - conjunctivae/corneas clear. PERRL, EOM's intact. Fundi benign  Ears - External ears normal. Canals clear. TM's normal.  Nose/Sinuses - Nares normal. Septum midline. Mucosa normal. No drainage or sinus tenderness.  Oropharynx - Lips, mucosa, and tongue normal. Teeth and gums normal.  Neck - Neck supple. No adenopathy. Thyroid symmetric, normal size,  Lungs - Percussion normal. Good diaphragmatic excursion. Lungs clear  Heart - PMI normal. No lifts, heaves, or thrills. RRR. No murmurs, clicks gallops or rub  Abdomen - Abdomen soft, non-tender. BS normal. No masses, organomegaly  Extremities - Extremities normal. No deformities, edema, or skin discoloration.  Musculoskeletal - Spine ROM normal. Muscular strength intact.  Peripheral pulses - radial=4/4, femoral=4/4, popliteal=4/4, dorsalis pedis=4/4,  Neuro - Gait normal. Reflexes normal and symmetric. Sensation grossly WNL.  Genitalia - Penis normal. No urethral discharge. Scrotum normal to palpation. No hernia.  Rectal - Rectal negative. Prostate palpation negative.  No rectal masses or abnormalities      Orders Only on 03/26/2018   Component Date Value Ref Range Status     25 OH Vit D2 03/26/2018 <5  ug/L Final     25 OH Vit D3 03/26/2018 69  ug/L Final     25 OH Vit D total " 03/26/2018 <74  20 - 75 ug/L Final    Comment: Season, race, dietary intake, and treatment affect the concentration of   25-hydroxy-Vitamin D. Values may decrease during winter months and increase   during summer months. Values 20-29 ug/L may indicate Vitamin D insufficiency   and values <20 ug/L may indicate Vitamin D deficiency.  This test was developed and its performance characteristics determined by the   [facility]  [facility]. It has   not been cleared or approved by the FDA. The laboratory is regulated under   CLIA as qualified to perform high-complexity testing. This test is used for   clinical purposes. It should not be regarded as investigational or for   research.         ASSESSMENT:    ICD-10-CM    1. Routine general medical examination at a health care facility Z00.00    2. Screening for HIV (human immunodeficiency virus) Z11.4    3. Need for vaccination Z23 1st  Administration  [51129]   4. Need for prophylactic vaccination and inoculation against influenza Z23 Each additional admin.  (Right click and add QUANTITY)  [68075]     FLU VACCINE, SPLIT VIRUS, IM (QUADRIVALENT) [56056]- >3 YRS   5. Abnormal finding on thyroid function test R94.6 TSH WITH FREE T4 REFLEX   6. Vitamin D deficiency E55.9 25 Hydroxyvitamin D2 and D3   7. Prolactin increased (H) E22.9 Prolactin   8. Drug-induced constipation K59.03        Well-Adult Physical Exam.  Health Maintenance Due   Topic Date Due     HPV IMMUNIZATION (1 - Male 3-dose series) 12/09/2009     HIV SCREEN (SYSTEM ASSIGNED)  12/09/2016     INFLUENZA VACCINE (1) 09/01/2018     TSH Q1 YEAR  01/18/2019     PHQ-2 Q1 YR  01/18/2019     Health Maintenance   Topic Date Due     HPV IMMUNIZATION (1 - Male 3-dose series) 12/09/2009     HIV SCREEN (SYSTEM ASSIGNED)  12/09/2016     INFLUENZA VACCINE (1) 09/01/2018     TSH Q1 YEAR  01/18/2019     PHQ-2 Q1 YR  01/18/2019     DTAP/TDAP/TD IMMUNIZATION (7 - Td) 11/06/2019      ZOSTER IMMUNIZATION (1 of 2) 12/09/2048     IPV IMMUNIZATION  Completed     MENINGITIS IMMUNIZATION  Completed         HEALTH CARE MAINTENENCE: The recommended screening tests and vaccinatons for this patient have been discussed as above.  The appropriate tests and vaccinations  have been ordered or declined by the patient. Please see the orders in EPIC.The patient specifically declines: HPV vaccination(s)     Immunization Status:  up to date and documented except for HPV    Patient Active Problem List   Diagnosis     Pervasive developmental disorder     Generalized anxiety disorder     Vitamin D deficiency     Subclinical hypothyroidism        ATP III Guidelines  ICSI Preventive Guidelines    PLAN:     Start taking miralax 1/2 capful a day(s)   If that causes loose stools go to 1/4 capful a day(s)   If not having a bowel movement at least every 3rd day increase to a full capful a day(s)   Check a fasting lipid profile  Tdap recommended  Flu shot recommended  Discussed calcium intake, vitamins and supplements. Recommended 1000 mg of calcium daily  Sunscreen use was recommended especially in the area of tatoos  Recommended dental exams every 6 months  Follow up in 1 year for the next preventative medical visit        Body mass index is 19.67 kg/m .

## 2019-02-02 ASSESSMENT — ANXIETY QUESTIONNAIRES: GAD7 TOTAL SCORE: 5

## 2019-02-04 LAB
DEPRECATED CALCIDIOL+CALCIFEROL SERPL-MC: <58 UG/L (ref 20–75)
VITAMIN D2 SERPL-MCNC: <5 UG/L
VITAMIN D3 SERPL-MCNC: 53 UG/L

## 2019-02-04 NOTE — RESULT ENCOUNTER NOTE
Suraj,  I have reviewed the results of the laboratory tests that we recently ordered. All of the lab work performed was normal or considered normal for you.  Sincerely,   Gopal Lombardo

## 2019-06-21 ENCOUNTER — OFFICE VISIT (OUTPATIENT)
Dept: PEDIATRICS | Facility: CLINIC | Age: 19
End: 2019-06-21
Payer: COMMERCIAL

## 2019-06-21 VITALS
TEMPERATURE: 97.2 F | SYSTOLIC BLOOD PRESSURE: 111 MMHG | WEIGHT: 162 LBS | BODY MASS INDEX: 18.96 KG/M2 | HEART RATE: 76 BPM | DIASTOLIC BLOOD PRESSURE: 71 MMHG

## 2019-06-21 DIAGNOSIS — L03.011 CELLULITIS OF FINGER OF RIGHT HAND: Primary | ICD-10-CM

## 2019-06-21 PROCEDURE — 99213 OFFICE O/P EST LOW 20 MIN: CPT | Performed by: PEDIATRICS

## 2019-06-21 RX ORDER — CEPHALEXIN 500 MG/1
1000 CAPSULE ORAL 2 TIMES DAILY
Qty: 40 CAPSULE | Refills: 0 | Status: SHIPPED | OUTPATIENT
Start: 2019-06-21 | End: 2019-07-01

## 2019-06-21 NOTE — PROGRESS NOTES
Subjective    Suraj Hawthorne is a 18 year old male who presents to clinic today with mother because of:  Thumb Discomfort (right thumb X 2 weeks )     HPI   Concerns: Patient c/o possible infection in right thumb. Patient picked off a callus X 2 weeks ago, and also has been sucking on that area. Area is red with swelling and pain.Mother tried topical abx oint and band-aid.          Review of Systems  Constitutional, eye, ENT, skin, respiratory, cardiac, and GI are normal except as otherwise noted.    PROBLEM LIST  Patient Active Problem List    Diagnosis Date Noted     Subclinical hypothyroidism 05/11/2015     Priority: Medium     Vitamin D deficiency 04/14/2014     Priority: Medium     Problem list name updated by automated process. Provider to review       Generalized anxiety disorder 12/04/2011     Priority: Medium     Diagnosis updated by automated process. Provider to review and confirm.       Pervasive developmental disorder      Priority: Medium      MEDICATIONS    Current Outpatient Medications on File Prior to Visit:  cholecalciferol (VITAMIN D3) 1000 UNIT tablet Take 1 tablet (1,000 Units) by mouth daily   citalopram (CELEXA) 20 MG tablet Take 10 mg by mouth daily    guanFACINE (TENEX) 1 MG tablet Take 0.5 tablets by mouth 2 times daily.   risperiDONE (RISPERDAL) 1 MG tablet 1.5 mg     No current facility-administered medications on file prior to visit.   ALLERGIES  Allergies   Allergen Reactions     Nkda [No Known Drug Allergies]      Reviewed and updated as needed this visit by Provider           Objective    /71   Pulse 76   Temp 97.2  F (36.2  C) (Oral)   Wt 162 lb (73.5 kg)   BMI 18.96 kg/m    Normalized weight-for-age data not available for patients older than 20 years.    Physical Exam  GENERAL: Active, alert, in no acute distress.  SKIN: 2.5 cm oval scarred red area with  dried yellow scab in the middle on the right thumb, no red streaks, no tenderness, no active drainage  HEAD:  telepsych at bedside Normocephalic.  EYES:  No discharge or erythema. Normal pupils and EOM.  LUNGS: Clear. No rales, rhonchi, wheezing or retractions  HEART: Regular rhythm. Normal S1/S2. No murmurs.  EXTREMITIES: see right thumb description under skin  Diagnostics: None      Assessment & Plan    Cellulitis right thumb ; Autism spectrum disorder with developmental delay  Keflex po BID x 10 days  Stop topical abx oint, apply clean 2x2 gauze to the area to prevent picking and sucking on the thumb    RTC if area gets more red or tender, if develops any red streaks around it, if having purulent discharge, or with any other concerns    Nanette Slade MD

## 2019-10-08 ENCOUNTER — E-VISIT (OUTPATIENT)
Dept: FAMILY MEDICINE | Facility: CLINIC | Age: 19
End: 2019-10-08
Payer: COMMERCIAL

## 2019-10-08 DIAGNOSIS — R30.0 DYSURIA: Primary | ICD-10-CM

## 2019-10-08 DIAGNOSIS — R30.0 DYSURIA: ICD-10-CM

## 2019-10-08 PROCEDURE — 99444 ZZC PHYSICIAN ONLINE EVALUATION & MANAGEMENT SERVICE: CPT | Performed by: FAMILY MEDICINE

## 2019-10-09 DIAGNOSIS — R30.0 DYSURIA: ICD-10-CM

## 2019-10-09 LAB
ALBUMIN UR-MCNC: NEGATIVE MG/DL
AMORPH CRY #/AREA URNS HPF: ABNORMAL /HPF
APPEARANCE UR: CLEAR
BACTERIA #/AREA URNS HPF: ABNORMAL /HPF
BILIRUB UR QL STRIP: NEGATIVE
COLOR UR AUTO: YELLOW
GLUCOSE UR STRIP-MCNC: NEGATIVE MG/DL
HGB UR QL STRIP: NEGATIVE
KETONES UR STRIP-MCNC: NEGATIVE MG/DL
LEUKOCYTE ESTERASE UR QL STRIP: NEGATIVE
NITRATE UR QL: NEGATIVE
NON-SQ EPI CELLS #/AREA URNS LPF: ABNORMAL /LPF
PH UR STRIP: 6.5 PH (ref 5–7)
RBC #/AREA URNS AUTO: ABNORMAL /HPF
SOURCE: ABNORMAL
SP GR UR STRIP: <=1.005 (ref 1–1.03)
UROBILINOGEN UR STRIP-ACNC: 0.2 EU/DL (ref 0.2–1)
WBC #/AREA URNS AUTO: ABNORMAL /HPF

## 2019-10-09 PROCEDURE — 87186 SC STD MICRODIL/AGAR DIL: CPT | Performed by: FAMILY MEDICINE

## 2019-10-09 PROCEDURE — 87088 URINE BACTERIA CULTURE: CPT | Performed by: FAMILY MEDICINE

## 2019-10-09 PROCEDURE — 87086 URINE CULTURE/COLONY COUNT: CPT | Performed by: FAMILY MEDICINE

## 2019-10-09 PROCEDURE — 81001 URINALYSIS AUTO W/SCOPE: CPT | Performed by: FAMILY MEDICINE

## 2019-10-09 NOTE — RESULT ENCOUNTER NOTE
Suraj,  I have reviewed the results of the laboratory tests that we recently ordered. All of the laboratory that are back so far are normal or considered normal for you. There are still some labs pending and I will let you know those results when they   are available.  Sincerely,   Gopal Lombardo MD

## 2019-10-11 DIAGNOSIS — N30.00 ACUTE CYSTITIS WITHOUT HEMATURIA: Primary | ICD-10-CM

## 2019-10-11 DIAGNOSIS — L03.011 CELLULITIS OF FINGER OF RIGHT HAND: ICD-10-CM

## 2019-10-11 LAB
BACTERIA SPEC CULT: ABNORMAL
SPECIMEN SOURCE: ABNORMAL

## 2019-10-11 RX ORDER — SULFAMETHOXAZOLE/TRIMETHOPRIM 800-160 MG
1 TABLET ORAL 2 TIMES DAILY
Qty: 14 TABLET | Refills: 0 | Status: SHIPPED | OUTPATIENT
Start: 2019-10-11 | End: 2020-02-28

## 2019-11-08 ENCOUNTER — OFFICE VISIT (OUTPATIENT)
Dept: FAMILY MEDICINE | Facility: CLINIC | Age: 19
End: 2019-11-08
Payer: COMMERCIAL

## 2019-11-08 VITALS
WEIGHT: 163 LBS | BODY MASS INDEX: 19.08 KG/M2 | HEART RATE: 96 BPM | OXYGEN SATURATION: 97 % | SYSTOLIC BLOOD PRESSURE: 110 MMHG | DIASTOLIC BLOOD PRESSURE: 66 MMHG | TEMPERATURE: 98.1 F

## 2019-11-08 DIAGNOSIS — R30.0 DYSURIA: Primary | ICD-10-CM

## 2019-11-08 DIAGNOSIS — R31.0 GROSS HEMATURIA: ICD-10-CM

## 2019-11-08 LAB
ALBUMIN UR-MCNC: NEGATIVE MG/DL
APPEARANCE UR: CLEAR
BILIRUB UR QL STRIP: NEGATIVE
COLOR UR AUTO: YELLOW
GLUCOSE UR STRIP-MCNC: NEGATIVE MG/DL
HGB UR QL STRIP: NEGATIVE
KETONES UR STRIP-MCNC: NEGATIVE MG/DL
LEUKOCYTE ESTERASE UR QL STRIP: NEGATIVE
NITRATE UR QL: NEGATIVE
PH UR STRIP: 6.5 PH (ref 5–7)
SOURCE: NORMAL
SP GR UR STRIP: 1.01 (ref 1–1.03)
UROBILINOGEN UR STRIP-ACNC: 0.2 EU/DL (ref 0.2–1)

## 2019-11-08 PROCEDURE — 99213 OFFICE O/P EST LOW 20 MIN: CPT | Performed by: PHYSICIAN ASSISTANT

## 2019-11-08 PROCEDURE — 87086 URINE CULTURE/COLONY COUNT: CPT | Performed by: PHYSICIAN ASSISTANT

## 2019-11-08 PROCEDURE — 81003 URINALYSIS AUTO W/O SCOPE: CPT | Performed by: PHYSICIAN ASSISTANT

## 2019-11-08 RX ORDER — CIPROFLOXACIN 250 MG/1
250 TABLET, FILM COATED ORAL 2 TIMES DAILY
Qty: 14 TABLET | Refills: 0 | Status: SHIPPED | OUTPATIENT
Start: 2019-11-08 | End: 2020-02-10

## 2019-11-08 RX ORDER — CIPROFLOXACIN 500 MG/1
500 TABLET, FILM COATED ORAL 2 TIMES DAILY
Qty: 14 TABLET | Refills: 0 | Status: SHIPPED | OUTPATIENT
Start: 2019-11-08 | End: 2019-11-08

## 2019-11-08 ASSESSMENT — ENCOUNTER SYMPTOMS
FREQUENCY: 1
FEVER: 0
PALPITATIONS: 0
RESPIRATORY NEGATIVE: 1
HEMATOCHEZIA: 0
HEMATURIA: 1
SHORTNESS OF BREATH: 0
COUGH: 0
WHEEZING: 0
DIARRHEA: 0
DYSURIA: 0
ABDOMINAL PAIN: 0
FLANK PAIN: 0
CHILLS: 0
VOMITING: 0
CHEST TIGHTNESS: 0
CARDIOVASCULAR NEGATIVE: 1
FATIGUE: 0
NAUSEA: 0

## 2019-11-08 NOTE — PROGRESS NOTES
Subjective   Suraj Hawthorne is a 18 year old male who presents to clinic today with Mom for the following health issues:  HPI   Genitourinary symptoms    Duration: 2days    Description:  Mild dysuria, frequency and hematuria    Intensity:  mild    Accompanying signs and symptoms (fever/discharge/nausea/vomiting/back or abdominal pain):   No penile discharge, rashes or testicular pain.  No abdominal pain, n/v, constipation, diarrhea, bloody or black tarry stools.  No fever, chills or sweats.    History (frequent UTI's/kidney stones/prostate problems):  Had recent UTI a month ago in which urine culture grew staph.   Sexually active: no.  No concerns regarding STD infections.  He is not on any blood thinners.    Precipitating or alleviating factors: None    Therapies tried and outcome: cranberry juice and increase fluid intake   Outcome: no relief    Patient Active Problem List   Diagnosis     Pervasive developmental disorder     Generalized anxiety disorder     Vitamin D deficiency     Subclinical hypothyroidism     Past Surgical History:   Procedure Laterality Date     HERNIA REPAIR, INGUINAL RT/LT      L       Social History     Tobacco Use     Smoking status: Never Smoker     Smokeless tobacco: Never Used   Substance Use Topics     Alcohol use: No     Family History   Problem Relation Age of Onset     Allergies Mother      Depression Mother      Eye Disorder Mother      Thyroid Disease Mother         graves     Depression/Anxiety Mother         MARK     Thyroid Disease Mother         Graves     Asthma Mother      Allergies Maternal Grandmother      Arthritis Maternal Grandmother      Gynecology Maternal Grandmother         hyst     Osteoporosis Maternal Grandmother      Arthritis Paternal Grandmother      Depression Paternal Grandmother      Eye Disorder Paternal Grandmother         cataracts     Depression Paternal Grandfather      Thyroid Disease Paternal Grandfather      Diabetes Paternal Grandfather       Hypertension Paternal Grandfather          Current Outpatient Medications   Medication Sig Dispense Refill     cholecalciferol (VITAMIN D3) 1000 UNIT tablet Take 1 tablet (1,000 Units) by mouth daily       citalopram (CELEXA) 20 MG tablet Take 10 mg by mouth daily        guanFACINE (TENEX) 1 MG tablet Take 0.5 tablets by mouth 2 times daily.       risperiDONE (RISPERDAL) 1 MG tablet 1.5 mg  5     Allergies   Allergen Reactions     Nkda [No Known Drug Allergies]      Reviewed and updated as needed this visit by Provider       Review of Systems   Constitutional: Negative for chills, fatigue and fever.   Respiratory: Negative.  Negative for cough, chest tightness, shortness of breath and wheezing.    Cardiovascular: Negative.  Negative for chest pain, palpitations and peripheral edema.   Gastrointestinal: Negative for abdominal pain, diarrhea, hematochezia, nausea and vomiting.   Genitourinary: Positive for frequency and hematuria. Negative for discharge, dysuria, flank pain, penile pain, penile swelling, testicular pain and urgency.   All other systems reviewed and are negative.           Objective    /66   Pulse 96   Temp 98.1  F (36.7  C) (Oral)   Wt 73.9 kg (163 lb)   SpO2 97%   BMI 19.08 kg/m    Body mass index is 19.08 kg/m .  Physical Exam  Vitals signs and nursing note reviewed.   Constitutional:       General: He is not in acute distress.     Appearance: Normal appearance. He is well-developed and normal weight. He is not ill-appearing.   Cardiovascular:      Rate and Rhythm: Normal rate and regular rhythm.      Pulses: Normal pulses.      Heart sounds: Normal heart sounds, S1 normal and S2 normal. No murmur. No friction rub. No gallop.    Pulmonary:      Effort: Pulmonary effort is normal. No respiratory distress.      Breath sounds: Normal breath sounds and air entry. No wheezing or rales.   Abdominal:      General: Abdomen is flat. Bowel sounds are normal. There is no distension.      Palpations:  Abdomen is soft. There is no mass.      Tenderness: There is no tenderness. There is no right CVA tenderness, left CVA tenderness, guarding or rebound. Negative signs include Marvin's sign, Rovsing's sign and McBurney's sign.      Hernia: No hernia is present. There is no hernia in the right inguinal area or left inguinal area.   Genitourinary:     Pubic Area: No rash.       Penis: Normal and uncircumcised. No erythema, discharge, swelling or lesions.       Scrotum/Testes: Normal. Cremasteric reflex is present.         Right: Mass, tenderness or swelling not present.         Left: Mass, tenderness or swelling not present.      Epididymis:      Right: Normal. Not inflamed or enlarged. No tenderness.      Left: Normal. Not inflamed or enlarged. No tenderness.   Skin:     General: Skin is warm and dry.   Neurological:      Mental Status: He is alert and oriented to person, place, and time.   Psychiatric:         Mood and Affect: Mood normal.         Behavior: Behavior normal.         Thought Content: Thought content normal.         Judgement: Judgment normal.     Diagnostic Test Results:  Labs reviewed in Epic  Results for orders placed or performed in visit on 11/08/19 (from the past 24 hour(s))   UA reflex to Microscopic and Culture   Result Value Ref Range    Color Urine Yellow     Appearance Urine Clear     Glucose Urine Negative NEG^Negative mg/dL    Bilirubin Urine Negative NEG^Negative    Ketones Urine Negative NEG^Negative mg/dL    Specific Gravity Urine 1.010 1.003 - 1.035    Blood Urine Negative NEG^Negative    pH Urine 6.5 5.0 - 7.0 pH    Protein Albumin Urine Negative NEG^Negative mg/dL    Urobilinogen Urine 0.2 0.2 - 1.0 EU/dL    Nitrite Urine Negative NEG^Negative    Leukocyte Esterase Urine Negative NEG^Negative    Source Midstream Urine            Assessment & Plan   Dysuria:  UA was normal but this is what happened last time when urine culture came back positive.  He has failed bactrim DS and keflex.   Will empirically treat with [id number] based on H&P.  Discussed risks and benefits of medication along with side effects, direction for use, tendon rupture.  Will send for UC to help guide abx treatment.  Recommend increase fluids, regular voids, proper wiping techniques and voiding after intercourse.  Educated patient on warning signs of kidney infection and to go to the ER if he develops any of these symptoms.  Recheck in clinic if symptoms worsen or if symptoms do not improve.  -     UA reflex to Microscopic and Culture  -     Urine Culture Aerobic Bacterial  -     ciprofloxacin (CIPRO) 250 MG tablet; Take 1 tablet (250 mg) by mouth 2 times daily for 7 days    Gross hematuria:  No blood present on UA.  Repeat UA 1week after completion of antibiotics.           Maya See MELINDA Buitrago  [facility]

## 2019-11-09 LAB
BACTERIA SPEC CULT: NO GROWTH
SPECIMEN SOURCE: NORMAL

## 2020-02-10 ENCOUNTER — OFFICE VISIT (OUTPATIENT)
Dept: FAMILY MEDICINE | Facility: CLINIC | Age: 20
End: 2020-02-10
Payer: COMMERCIAL

## 2020-02-10 VITALS
HEIGHT: 78 IN | OXYGEN SATURATION: 99 % | HEART RATE: 76 BPM | WEIGHT: 166 LBS | TEMPERATURE: 98.6 F | RESPIRATION RATE: 18 BRPM | SYSTOLIC BLOOD PRESSURE: 125 MMHG | DIASTOLIC BLOOD PRESSURE: 77 MMHG | BODY MASS INDEX: 19.21 KG/M2

## 2020-02-10 DIAGNOSIS — Z00.00 ROUTINE GENERAL MEDICAL EXAMINATION AT A HEALTH CARE FACILITY: Primary | ICD-10-CM

## 2020-02-10 DIAGNOSIS — Z13.6 CARDIOVASCULAR SCREENING; LDL GOAL LESS THAN 160: ICD-10-CM

## 2020-02-10 DIAGNOSIS — E55.9 VITAMIN D DEFICIENCY: ICD-10-CM

## 2020-02-10 DIAGNOSIS — E03.8 SUBCLINICAL HYPOTHYROIDISM: ICD-10-CM

## 2020-02-10 DIAGNOSIS — Z83.3 FAMILY HISTORY OF DIABETES MELLITUS: ICD-10-CM

## 2020-02-10 PROCEDURE — 99395 PREV VISIT EST AGE 18-39: CPT | Performed by: FAMILY MEDICINE

## 2020-02-10 ASSESSMENT — ENCOUNTER SYMPTOMS
JOINT SWELLING: 0
NERVOUS/ANXIOUS: 1
MYALGIAS: 0
ABDOMINAL PAIN: 0
HEADACHES: 0
PARESTHESIAS: 0
DIARRHEA: 0
CONSTIPATION: 1
CHILLS: 0
EYE PAIN: 0
WEAKNESS: 0
DIZZINESS: 0
FEVER: 0
HEMATURIA: 0
SORE THROAT: 0
ARTHRALGIAS: 0
COUGH: 0
FREQUENCY: 0
DYSURIA: 0
SHORTNESS OF BREATH: 0
HEARTBURN: 0
HEMATOCHEZIA: 0
NAUSEA: 0
PALPITATIONS: 0

## 2020-02-10 ASSESSMENT — PAIN SCALES - GENERAL: PAINLEVEL: NO PAIN (0)

## 2020-02-10 ASSESSMENT — MIFFLIN-ST. JEOR: SCORE: 1895.19

## 2020-02-10 NOTE — PROGRESS NOTES
SUBJECTIVE:   CC: Suraj Hawthorne is an 19 year old male who presents for preventative health visit.     Healthy Habits:     Getting at least 3 servings of Calcium per day:  Yes    Bi-annual eye exam:  NO    Dental care twice a year:  Yes    Sleep apnea or symptoms of sleep apnea:  Daytime drowsiness    Diet:  Regular (no restrictions)    Frequency of exercise:  2-3 days/week    Duration of exercise:  15-30 minutes    Taking medications regularly:  Yes    Medication side effects:  Not applicable    PHQ-2 Total Score: 2    Additional concerns today:  No              Today's PHQ-2 Score:   PHQ-2 ( 1999 Pfizer) 2/10/2020   Q1: Little interest or pleasure in doing things 1   Q2: Feeling down, depressed or hopeless 1   PHQ-2 Score 2   Q1: Little interest or pleasure in doing things Several days   Q2: Feeling down, depressed or hopeless Several days   PHQ-2 Score 2       Abuse: Current or Past(Physical, Sexual or Emotional)- No  Do you feel safe in your environment? Yes        Social History     Tobacco Use     Smoking status: Never Smoker     Smokeless tobacco: Never Used   Substance Use Topics     Alcohol use: No         Alcohol Use 2/10/2020   Prescreen: >3 drinks/day or >7 drinks/week? Not Applicable   Prescreen: >3 drinks/day or >7 drinks/week? -       Last PSA: No results found for: PSA    Reviewed orders with patient. Reviewed health maintenance and updated orders accordingly -       Reviewed and updated as needed this visit by clinical staff         Reviewed and updated as needed this visit by Provider            Review of Systems   Constitutional: Negative for chills and fever.   HENT: Negative for congestion, ear pain, hearing loss and sore throat.    Eyes: Negative for pain and visual disturbance.   Respiratory: Negative for cough and shortness of breath.    Cardiovascular: Negative for chest pain, palpitations and peripheral edema.   Gastrointestinal: Positive for constipation. Negative for abdominal pain,  "diarrhea, heartburn, hematochezia and nausea.   Genitourinary: Negative for discharge, dysuria, frequency, genital sores, hematuria, impotence and urgency.   Musculoskeletal: Negative for arthralgias, joint swelling and myalgias.   Skin: Negative for rash.   Neurological: Negative for dizziness, weakness, headaches and paresthesias.   Psychiatric/Behavioral: Positive for mood changes. The patient is nervous/anxious.          OBJECTIVE:   There were no vitals taken for this visit.    Physical Exam          ASSESSMENT/PLAN:       ICD-10-CM    1. Routine general medical examination at a health care facility Z00.00    2. Vitamin D deficiency E55.9 25 Hydroxyvitamin D2 and D3   3. Subclinical hypothyroidism E03.9 TSH WITH FREE T4 REFLEX   4. CARDIOVASCULAR SCREENING; LDL GOAL LESS THAN 160 Z13.6 Lipid panel reflex to direct LDL Fasting   5. Family history of diabetes mellitus Z83.3 **Comprehensive metabolic panel FUTURE anytime       COUNSELING:   Reviewed preventive health counseling, as reflected in patient instructions       Consider AAA screening for ages 65-75 and smoking history       Regular exercise       Healthy diet/nutrition       Immunizations    Declined: Influenza due to Other it is late in the season already               Family planning       Osteoporosis Prevention/Bone Health    Estimated body mass index is 19.08 kg/m  as calculated from the following:    Height as of 2/1/19: 1.969 m (6' 5.5\").    Weight as of 11/8/19: 73.9 kg (163 lb).          reports that he has never smoked. He has never used smokeless tobacco.      Counseling Resources:  ATP IV Guidelines  Pooled Cohorts Equation Calculator  FRAX Risk Assessment  ICSI Preventive Guidelines  Dietary Guidelines for Americans, 2010  USDA's MyPlate  ASA Prophylaxis  Lung CA Screening    Gopal Lombardo MD  [facility] " [place]  --------------------------------------------------------------------------------------------------------------------------------------  SUBJECTIVE:  Suraj Hawthorne is a 19 year old male who presents to the clinic today for a routine physical exam.    The patient's last physical was 1 years ago.     Cholesterol   Date Value Ref Range Status   04/08/2015 163 <170 mg/dL Final     Comment:     LDL Cholesterol is the primary guide to therapy.   The NCEP recommends further evaluation of: patients with cholesterol greater   than 200 mg/dL if additional risk factors are present, cholesterol greater   than   240 mg/dL, triglycerides greater than 150 mg/dL, or HDL less than 40 mg/dL.     04/09/2014 148 <170 mg/dL Final     Comment:     LDL Cholesterol is the primary guide to therapy.   The NCEP recommends further evaluation of: patients with cholesterol greater   than 200 mg/dL if additional risk factors are present, cholesterol greater   than   240 mg/dL, triglycerides greater than 150 mg/dL, or HDL less than 40 mg/dL.     HDL Cholesterol   Date Value Ref Range Status   04/08/2015 67 >45 mg/dL Final   04/09/2014 51 >45 mg/dL Final     LDL Cholesterol Calculated   Date Value Ref Range Status   04/08/2015 83 0 - 129 mg/dL Final     Comment:     LDL Cholesterol is the primary guide to therapy: LDL-cholesterol goal in high   risk patients is <100 mg/dL and in very high risk patients is <70 mg/dL.     04/09/2014 82 0 - 129 mg/dL Final     Comment:     LDL Cholesterol is the primary guide to therapy: LDL-cholesterol goal in high   risk patients is <100 mg/dL and in very high risk patients is <70 mg/dL.     Triglycerides   Date Value Ref Range Status   04/08/2015 64 0 - 150 mg/dL Final   04/09/2014 70 0 - 150 mg/dL Final     Cholesterol/HDL Ratio   Date Value Ref Range Status   04/08/2015 2.4 0.0 - 5.0 Final   04/09/2014 2.9 0.0 - 5.0 Final     The patient's last fasting lipid panel was done 5 years ago and the results  are listed above.        The ASCVD Risk score (Shelllexis LOPEZ Jr., et al., 2013) failed to calculate for the following reasons:    The 2013 ASCVD risk score is only valid for ages 40 to 79    Risk Enhancers:  Family history of premature ASCVD  LDL >159  Chronic kidney disease   Metabolic Syndrome  Inflammatory conditions (RA, psoriasis, HIV)  SE  Ancestry  Triglycerides >174      The patient reports that he has never been treated for high blood pressure.    The patient reports that he does not take a daily aspirin.    No results found for: HCVAB  The patient reports that he has not been screened for Hepatitis C    (Screen all baby boomers once per CDC-- the generation born from 1945 through 1965 and per USPTF screen age 19 to 79 especially younger people who have used IV drugs)  He would not like to have an Hepatitis C test today    No results found for: HIAGAB  The patient reports that he has not been screened for HIV   (Screen all 15 to 64 years old)  He would not like to have an HIV test today      Immunization History   Administered Date(s) Administered     Comvax (HIB/HepB) 02/10/1999, 04/12/1999, 03/10/2000     DTAP (<7y) 02/10/1999, 04/12/1999, 06/14/1999, 03/10/2000, 08/26/2003     HEPA 10/29/2007, 10/31/2008     Influenza (IIV3) PF 10/29/2005, 12/05/2005     Influenza Vaccine IM > 6 months Valent IIV4 01/18/2018, 02/01/2019     MMR 03/10/2000, 08/26/2003     Meningococcal (Menactra ) 12/08/2011, 01/12/2015     Pneumo Conj 13-V (2010&after) 11/18/2010     Poliovirus, inactivated (IPV) 02/10/1999, 04/12/1999, 03/10/2000, 08/26/2003     TDAP Vaccine (Adacel) 11/06/2009, 02/01/2019     Varicella 03/10/2000, 10/29/2007     The patient's believes that his last tetanus shot was given 1 year(s) ago.   The patient believes that he has not had a Shingrix in the past  The patient believes that he has not had a PPSV23 in the past.  The patient believes that he has not had a PCV13 in the past.  The patient believes that  he has not had a seasonal flu vaccination this fall or winter.  The patient would like to have a no vaccinations today      No results found for this or any previous visit.]   The patient reports a family history of colon cancer in his great uncle.  The patient reports that he has not had a colonoscopy.     The patient reports that he does not performs a self testicular exam monthly.  His currently used contraception is n/a.   He is not dating at this time and has not in the past.    The patient reports a family history of diabetes in his Great  Grandfather .    The patient reports that he eats or drinks 2 servings of dairy products per day. He does not take a calcium supplement at all.  The patient reports that he has dental appointments approximately every 6 months.  The patient reports that he  has had an eye examination in the last few years and has not visual difficulty.       Do you currently smoke? No  How many years have you smoked? 0   How many packs per day did you smoke on average? N/A  (if more than 30 pack year history and the patient is age 55-80 consider ordering an annual low dose radiation lung CT to screen for cancer)  (Do not order if patient has quit more than 15 years ago or has a health condition that limits life expectancy or could not tolerate curative lung surgery)  Are you interested having a lung CT to screen for lung cancer? N/A    If the patient has smoked more that 100 cigarettes, has the patient had an imaging study (US or CT) for an AAA between the ages of 65 and 75? N/A            Patient Active Problem List   Diagnosis     Pervasive developmental disorder     Generalized anxiety disorder     Vitamin D deficiency     Subclinical hypothyroidism       Past Surgical History:   Procedure Laterality Date     HERNIA REPAIR, INGUINAL RT/LT      L       Family History   Problem Relation Age of Onset     Allergies Mother      Depression Mother      Eye Disorder Mother      Thyroid Disease  Mother         graves     Depression/Anxiety Mother         MARK     Thyroid Disease Mother         Graves     Asthma Mother      Anxiety Disorder Mother      Allergies Maternal Grandmother      Arthritis Maternal Grandmother      Gynecology Maternal Grandmother         hyst     Osteoporosis Maternal Grandmother      Arthritis Paternal Grandmother      Depression Paternal Grandmother      Eye Disorder Paternal Grandmother         cataracts     Depression Paternal Grandfather      Thyroid Disease Paternal Grandfather      Diabetes Paternal Grandfather      Hypertension Paternal Grandfather      Depression Maternal Grandfather        Social History     Socioeconomic History     Marital status: Single     Spouse name: Not on file     Number of children: Not on file     Years of education: Not on file     Highest education level: Not on file   Occupational History     Not on file   Social Needs     Financial resource strain: Not on file     Food insecurity:     Worry: Not on file     Inability: Not on file     Transportation needs:     Medical: Not on file     Non-medical: Not on file   Tobacco Use     Smoking status: Never Smoker     Smokeless tobacco: Never Used   Substance and Sexual Activity     Alcohol use: No     Drug use: No     Sexual activity: Never   Lifestyle     Physical activity:     Days per week: Not on file     Minutes per session: Not on file     Stress: Not on file   Relationships     Social connections:     Talks on phone: Not on file     Gets together: Not on file     Attends Oriental orthodox service: Not on file     Active member of club or organization: Not on file     Attends meetings of clubs or organizations: Not on file     Relationship status: Not on file     Intimate partner violence:     Fear of current or ex partner: Not on file     Emotionally abused: Not on file     Physically abused: Not on file     Forced sexual activity: Not on file   Other Topics Concern     Parent/sibling w/ CABG, MI or  "angioplasty before 65F 55M? No   Social History Narrative     Not on file       Current Outpatient Medications   Medication Sig Dispense Refill     cholecalciferol (VITAMIN D3) 1000 UNIT tablet Take 1 tablet (1,000 Units) by mouth daily (Patient taking differently: Take 1,000 Units by mouth daily 2 tablets daily)       citalopram (CELEXA) 20 MG tablet Take 10 mg by mouth daily        guanFACINE (TENEX) 1 MG tablet Take 2 mg by mouth 2 times daily        risperiDONE (RISPERDAL) 1 MG tablet 1.5 mg  5       PHYSICAL EXAMINATION:  Blood pressure 125/77, pulse 76, temperature 98.6  F (37  C), temperature source Oral, resp. rate 18, height 1.988 m (6' 6.25\"), weight 75.3 kg (166 lb), SpO2 99 %.     Ht Readings from Last 2 Encounters:   02/10/20 1.988 m (6' 6.25\")   02/01/19 1.969 m (6' 5.5\")       General appearance - healthy, alert and no distress  Skin - Skin color, texture, turgor normal. No rashes or lesions.  Head - Normocephalic. No masses, lesions, tenderness or abnormalities  Eyes - conjunctivae/corneas clear. PERRL, EOM's intact. Fundi benign  Ears - External ears normal. Canals clear. TM's normal.  Nose/Sinuses - Nares normal. Septum midline. Mucosa normal. No drainage or sinus tenderness.  Oropharynx - Lips, mucosa, and tongue normal. Teeth and gums normal.  Neck - Neck supple. No adenopathy. Thyroid symmetric, normal size,  Lungs - Percussion normal. Good diaphragmatic excursion. Lungs clear  Heart - PMI normal. No lifts, heaves, or thrills. RRR. No murmurs, clicks gallops or rub  Abdomen - Abdomen soft, non-tender. BS normal. No masses, organomegaly  Extremities - Extremities normal. No deformities, edema, or skin discoloration.  Musculoskeletal - Spine ROM normal. Muscular strength intact.  Peripheral pulses - radial=4/4, femoral=4/4, popliteal=4/4, dorsalis pedis=4/4,  Neuro - Gait normal. Reflexes normal and symmetric. Sensation grossly WNL.  Genitalia - Penis normal. No urethral discharge. Scrotum normal " to palpation. No hernia.  Rectal - deferred      Office Visit on 11/08/2019   Component Date Value Ref Range Status     Color Urine 11/08/2019 Yellow   Final     Appearance Urine 11/08/2019 Clear   Final     Glucose Urine 11/08/2019 Negative  NEG^Negative mg/dL Final     Bilirubin Urine 11/08/2019 Negative  NEG^Negative Final     Ketones Urine 11/08/2019 Negative  NEG^Negative mg/dL Final     Specific Gravity Urine 11/08/2019 1.010  1.003 - 1.035 Final     Blood Urine 11/08/2019 Negative  NEG^Negative Final     pH Urine 11/08/2019 6.5  5.0 - 7.0 pH Final     Protein Albumin Urine 11/08/2019 Negative  NEG^Negative mg/dL Final     Urobilinogen Urine 11/08/2019 0.2  0.2 - 1.0 EU/dL Final     Nitrite Urine 11/08/2019 Negative  NEG^Negative Final     Leukocyte Esterase Urine 11/08/2019 Negative  NEG^Negative Final     Source 11/08/2019 Midstream Urine   Final     Specimen Description 11/08/2019 Midstream Urine   Final     Culture Micro 11/08/2019 No growth   Final       ASSESSMENT:    ICD-10-CM    1. Routine general medical examination at a health care facility Z00.00        Well-Adult Physical Exam.  Health Maintenance Due   Topic Date Due     HPV IMMUNIZATION (1 - Male 2-dose series) 12/09/2009     HIV SCREENING  12/09/2013     INFLUENZA VACCINE (1) 09/01/2019     PHQ-2  01/01/2020     PREVENTIVE CARE VISIT  02/01/2020     TSH W/FREE T4 REFLEX  02/01/2020     Health Maintenance   Topic Date Due     HPV IMMUNIZATION (1 - Male 2-dose series) 12/09/2009     HIV SCREENING  12/09/2013     INFLUENZA VACCINE (1) 09/01/2019     PHQ-2  01/01/2020     PREVENTIVE CARE VISIT  02/01/2020     TSH W/FREE T4 REFLEX  02/01/2020     DTAP/TDAP/TD IMMUNIZATION (8 - Td) 02/01/2029     IPV IMMUNIZATION  Completed     MENINGITIS IMMUNIZATION  Completed         HEALTH CARE MAINTENENCE: The recommended screening tests and vaccinatons for this patient have been discussed as above.  The appropriate tests and vaccinations  have been ordered or  declined by the patient. Please see the orders in EPIC.The patient specifically declines: influenza vaccination(s)     Immunization Status:  up to date and documented except for influenza     Patient Active Problem List   Diagnosis     Pervasive developmental disorder     Generalized anxiety disorder     Vitamin D deficiency     Subclinical hypothyroidism        ATP III Guidelines  ICSI Preventive Guidelines    PLAN:   The patient will make a future lab appointment for all bloodwork as they are not fasting today  Check a fasting lipid profile  Flu shot recommended  Check a fasting glucose  Discussed calcium intake, vitamins and supplements. Recommended 1000 mg of calcium daily  Sunscreen use was recommended especially in the area of tatoos  Recommended dental exams every 6 months  Follow up in 1 year for the next preventative medical visit        Body mass index is 19.06 kg/m .         (E55.9) Vitamin D deficiency  Comment:   Plan: 25 Hydroxyvitamin D2 and D3            (E03.9) Subclinical hypothyroidism  Comment:   Plan: TSH WITH FREE T4 REFLEX            (Z13.6) CARDIOVASCULAR SCREENING; LDL GOAL LESS THAN 160  Comment:   Plan: Lipid panel reflex to direct LDL Fasting            (Z83.3) Family history of diabetes mellitus  Comment:   Plan: **Comprehensive metabolic panel FUTURE anytime

## 2020-02-10 NOTE — PROGRESS NOTES
"  3  SUBJECTIVE:   CC: Suraj Hawthorne is an 19 year old male who presents for preventive health visit.     Healthy Habits:    Do you get at least three servings of calcium containing foods daily (dairy, green leafy vegetables, etc.)? { :668833::\"yes\"}    Amount of exercise or daily activities, outside of work: { :993559}    Problems taking medications regularly { :872962::\"No\"}    Medication side effects: { :175236::\"No\"}    Have you had an eye exam in the past two years? { :859451}    Do you see a dentist twice per year? { :732129}    Do you have sleep apnea, excessive snoring or daytime drowsiness?{ :651670}  {Outside tests to abstract? :606377}    {additional problems to add (Optional):527410}    Today's PHQ-2 Score:   PHQ-2 ( 1999 [employer]) 2/1/2019 1/18/2018   Q1: Little interest or pleasure in doing things 1 0   Q2: Feeling down, depressed or hopeless 1 1   PHQ-2 Score 2 1   Q1: Little interest or pleasure in doing things Several days Not at all   Q2: Feeling down, depressed or hopeless Several days Several days   PHQ-2 Score 2 1     {PHQ-2 LOOK IN ASSESSMENTS (Optional) :543832}  Abuse: Current or Past(Physical, Sexual or Emotional)- {YES/NO/NA:368059}  Do you feel safe in your environment? {YES/NO/NA:650478}        Social History     Tobacco Use     Smoking status: Never Smoker     Smokeless tobacco: Never Used   Substance Use Topics     Alcohol use: No     If you drink alcohol do you typically have >3 drinks per day or >7 drinks per week? {ETOH :986336}                      Last PSA: No results found for: PSA    Reviewed orders with patient. Reviewed health maintenance and updated orders accordingly - {Yes/No:137018::\"Yes\"}  {Chronicprobdata (Optional):574409}    Reviewed and updated as needed this visit by clinical staff         Reviewed and updated as needed this visit by Provider        {HISTORY OPTIONS (Optional):217817}    ROS:  { :425760::\"CONSTITUTIONAL: NEGATIVE for fever, chills, change in " "weight\",\"INTEGUMENTARY/SKIN: NEGATIVE for worrisome rashes, moles or lesions\",\"EYES: NEGATIVE for vision changes or irritation\",\"ENT: NEGATIVE for ear, mouth and throat problems\",\"RESP: NEGATIVE for significant cough or SOB\",\"CV: NEGATIVE for chest pain, palpitations or peripheral edema\",\"GI: NEGATIVE for nausea, abdominal pain, heartburn, or change in bowel habits\",\" male: negative for dysuria, hematuria, decreased urinary stream, erectile dysfunction, urethral discharge\",\"MUSCULOSKELETAL: NEGATIVE for significant arthralgias or myalgia\",\"NEURO: NEGATIVE for weakness, dizziness or paresthesias\",\"PSYCHIATRIC: NEGATIVE for changes in mood or affect\"}    OBJECTIVE:   There were no vitals taken for this visit.  EXAM:  {Exam Choices:761627}    {Diagnostic Test Results (Optional):249342::\"Diagnostic Test Results:\",\"Labs reviewed in Epic\"}    ASSESSMENT/PLAN:   {Diag Picklist:310374}    COUNSELING:  {MALE COUNSELING MESSAGES:842760::\"Reviewed preventive health counseling, as reflected in patient instructions\"}    Estimated body mass index is 19.08 kg/m  as calculated from the following:    Height as of 2/1/19: 1.969 m (6' 5.5\").    Weight as of 11/8/19: 73.9 kg (163 lb).    {Weight Management Plan (ACO) Complete if BMI is abnormal-  Ages 18-64  BMI >24.9.  Age 65+ with BMI <23 or >30 (Optional):508274}     reports that he has never smoked. He has never used smokeless tobacco.  {Tobacco Cessation -- Complete if patient is a smoker (Optional):201903}    Counseling Resources:  ATP IV Guidelines  Pooled Cohorts Equation Calculator  FRAX Risk Assessment  ICSI Preventive Guidelines  Dietary Guidelines for Americans, 2010  USDA's MyPlate  ASA Prophylaxis  Lung CA Screening    Gopal Lombardo MD  [facility]  "

## 2020-02-10 NOTE — NURSING NOTE
"Chief Complaint   Patient presents with     Physical     Health Maintenance     order pended, PHQ2,       Initial /77   Pulse 76   Temp 98.6  F (37  C) (Oral)   Resp 18   Ht 1.988 m (6' 6.25\")   Wt 75.3 kg (166 lb)   SpO2 99%   BMI 19.06 kg/m   Estimated body mass index is 19.06 kg/m  as calculated from the following:    Height as of this encounter: 1.988 m (6' 6.25\").    Weight as of this encounter: 75.3 kg (166 lb).  Medication Reconciliation: complete  Dora Almeida, DENISE  "

## 2020-02-10 NOTE — PATIENT INSTRUCTIONS
Preventive Health Recommendations  Male Ages 21 - 25     Yearly exam:             See your health care provider every year in order to  o   Review health changes.   o   Discuss preventive care.    o   Review your medicines if your doctor has prescribed any.    You should be tested each year for STDs (sexually transmitted diseases).     Talk to your provider about cholesterol testing.      If you are at risk for diabetes, you should have a diabetes test (fasting glucose).    Shots: Get a flu shot each year. Get a tetanus shot every 10 years.     Nutrition:    Eat at least 5 servings of fruits and vegetables daily.     Eat whole-grain bread, whole-wheat pasta and brown rice instead of white grains and rice.     Get adequate calcium and Vitamin D.     Lifestyle    Exercise for at least 150 minutes a week (30 minutes a day, 5 days a week). This will help you control your weight and prevent disease.     Limit alcohol to one drink per day.     No smoking.     Wear sunscreen to prevent skin cancer.     See your dentist every six months for an exam and cleaning.     Please schedule a future laboratory appointment(s) and be sure to have fasted for 10 hours prior to arrival

## 2020-02-12 ENCOUNTER — E-VISIT (OUTPATIENT)
Dept: FAMILY MEDICINE | Facility: CLINIC | Age: 20
End: 2020-02-12

## 2020-02-12 DIAGNOSIS — R30.0 DYSURIA: ICD-10-CM

## 2020-02-12 DIAGNOSIS — N39.0 URINARY TRACT INFECTION WITHOUT HEMATURIA, SITE UNSPECIFIED: Primary | ICD-10-CM

## 2020-02-12 DIAGNOSIS — R30.0 DYSURIA: Primary | ICD-10-CM

## 2020-02-12 LAB
ALBUMIN UR-MCNC: 30 MG/DL
APPEARANCE UR: CLEAR
BILIRUB UR QL STRIP: ABNORMAL
COLOR UR AUTO: YELLOW
GLUCOSE UR STRIP-MCNC: NEGATIVE MG/DL
HGB UR QL STRIP: ABNORMAL
KETONES UR STRIP-MCNC: ABNORMAL MG/DL
LEUKOCYTE ESTERASE UR QL STRIP: NEGATIVE
MUCOUS THREADS #/AREA URNS LPF: PRESENT /LPF
NITRATE UR QL: NEGATIVE
NON-SQ EPI CELLS #/AREA URNS LPF: ABNORMAL /LPF
PH UR STRIP: 6 PH (ref 5–7)
RBC #/AREA URNS AUTO: ABNORMAL /HPF
SOURCE: ABNORMAL
SP GR UR STRIP: >1.03 (ref 1–1.03)
TRANS CELLS #/AREA URNS HPF: ABNORMAL /HPF
UROBILINOGEN UR STRIP-ACNC: 0.2 EU/DL (ref 0.2–1)
WBC #/AREA URNS AUTO: ABNORMAL /HPF

## 2020-02-12 PROCEDURE — 99421 OL DIG E/M SVC 5-10 MIN: CPT | Performed by: FAMILY MEDICINE

## 2020-02-12 PROCEDURE — 87086 URINE CULTURE/COLONY COUNT: CPT | Performed by: FAMILY MEDICINE

## 2020-02-12 PROCEDURE — 81001 URINALYSIS AUTO W/SCOPE: CPT | Performed by: FAMILY MEDICINE

## 2020-02-12 RX ORDER — SULFAMETHOXAZOLE/TRIMETHOPRIM 800-160 MG
1 TABLET ORAL 2 TIMES DAILY
Qty: 14 TABLET | Refills: 0 | Status: SHIPPED | OUTPATIENT
Start: 2020-02-12 | End: 2020-02-28

## 2020-02-13 LAB
BACTERIA SPEC CULT: NORMAL
SPECIMEN SOURCE: NORMAL

## 2020-02-17 DIAGNOSIS — Z13.6 CARDIOVASCULAR SCREENING; LDL GOAL LESS THAN 160: ICD-10-CM

## 2020-02-17 DIAGNOSIS — E55.9 VITAMIN D DEFICIENCY: ICD-10-CM

## 2020-02-17 DIAGNOSIS — E03.8 SUBCLINICAL HYPOTHYROIDISM: ICD-10-CM

## 2020-02-17 DIAGNOSIS — Z83.3 FAMILY HISTORY OF DIABETES MELLITUS: ICD-10-CM

## 2020-02-17 LAB
ALBUMIN SERPL-MCNC: 4.4 G/DL (ref 3.4–5)
ALP SERPL-CCNC: 55 U/L (ref 40–150)
ALT SERPL W P-5'-P-CCNC: 19 U/L (ref 0–70)
ANION GAP SERPL CALCULATED.3IONS-SCNC: 5 MMOL/L (ref 3–14)
AST SERPL W P-5'-P-CCNC: 11 U/L (ref 0–45)
BILIRUB SERPL-MCNC: 0.5 MG/DL (ref 0.2–1.3)
BUN SERPL-MCNC: 14 MG/DL (ref 7–30)
CALCIUM SERPL-MCNC: 9.4 MG/DL (ref 8.5–10.1)
CHLORIDE SERPL-SCNC: 105 MMOL/L (ref 94–109)
CHOLEST SERPL-MCNC: 186 MG/DL
CO2 SERPL-SCNC: 27 MMOL/L (ref 20–32)
CREAT SERPL-MCNC: 1.18 MG/DL (ref 0.66–1.25)
GFR SERPL CREATININE-BSD FRML MDRD: 88 ML/MIN/{1.73_M2}
GLUCOSE SERPL-MCNC: 92 MG/DL (ref 70–99)
HDLC SERPL-MCNC: 59 MG/DL
LDLC SERPL CALC-MCNC: 112 MG/DL
NONHDLC SERPL-MCNC: 127 MG/DL
POTASSIUM SERPL-SCNC: 4.2 MMOL/L (ref 3.4–5.3)
PROT SERPL-MCNC: 7.4 G/DL (ref 6.8–8.8)
SODIUM SERPL-SCNC: 137 MMOL/L (ref 133–144)
TRIGL SERPL-MCNC: 76 MG/DL
TSH SERPL DL<=0.005 MIU/L-ACNC: 3.67 MU/L (ref 0.4–4)

## 2020-02-17 PROCEDURE — 80053 COMPREHEN METABOLIC PANEL: CPT | Performed by: FAMILY MEDICINE

## 2020-02-17 PROCEDURE — 84443 ASSAY THYROID STIM HORMONE: CPT | Performed by: FAMILY MEDICINE

## 2020-02-17 PROCEDURE — 82306 VITAMIN D 25 HYDROXY: CPT | Performed by: FAMILY MEDICINE

## 2020-02-17 PROCEDURE — 80061 LIPID PANEL: CPT | Performed by: FAMILY MEDICINE

## 2020-02-17 PROCEDURE — 36415 COLL VENOUS BLD VENIPUNCTURE: CPT | Performed by: FAMILY MEDICINE

## 2020-02-19 LAB
DEPRECATED CALCIDIOL+CALCIFEROL SERPL-MC: <57 UG/L (ref 20–75)
VITAMIN D2 SERPL-MCNC: <5 UG/L
VITAMIN D3 SERPL-MCNC: 52 UG/L

## 2020-02-19 NOTE — RESULT ENCOUNTER NOTE
Suraj, (and Avril)  I have reviewed the results of the laboratory tests that we recently ordered. All of the lab work performed was normal or considered normal for you.  Sincerely,   Gopal Lombardo MD

## 2020-02-23 ENCOUNTER — HEALTH MAINTENANCE LETTER (OUTPATIENT)
Age: 20
End: 2020-02-23

## 2020-02-28 ENCOUNTER — OFFICE VISIT (OUTPATIENT)
Dept: FAMILY MEDICINE | Facility: CLINIC | Age: 20
End: 2020-02-28
Payer: COMMERCIAL

## 2020-02-28 VITALS
HEIGHT: 78 IN | HEART RATE: 71 BPM | SYSTOLIC BLOOD PRESSURE: 113 MMHG | DIASTOLIC BLOOD PRESSURE: 71 MMHG | BODY MASS INDEX: 19.21 KG/M2 | TEMPERATURE: 96.6 F | WEIGHT: 166 LBS

## 2020-02-28 DIAGNOSIS — R35.0 URINARY FREQUENCY: Primary | ICD-10-CM

## 2020-02-28 LAB
ALBUMIN UR-MCNC: NEGATIVE MG/DL
APPEARANCE UR: CLEAR
BILIRUB UR QL STRIP: NEGATIVE
COLOR UR AUTO: YELLOW
GLUCOSE UR STRIP-MCNC: NEGATIVE MG/DL
HGB UR QL STRIP: NEGATIVE
KETONES UR STRIP-MCNC: NEGATIVE MG/DL
LEUKOCYTE ESTERASE UR QL STRIP: NEGATIVE
NITRATE UR QL: NEGATIVE
PH UR STRIP: 6 PH (ref 5–7)
SOURCE: NORMAL
SP GR UR STRIP: 1.01 (ref 1–1.03)
UROBILINOGEN UR STRIP-ACNC: 0.2 EU/DL (ref 0.2–1)

## 2020-02-28 PROCEDURE — 81003 URINALYSIS AUTO W/O SCOPE: CPT | Performed by: PHYSICIAN ASSISTANT

## 2020-02-28 PROCEDURE — 99213 OFFICE O/P EST LOW 20 MIN: CPT | Performed by: PHYSICIAN ASSISTANT

## 2020-02-28 ASSESSMENT — ENCOUNTER SYMPTOMS
FEVER: 0
DYSURIA: 1
BACK PAIN: 0
FREQUENCY: 1

## 2020-02-28 ASSESSMENT — MIFFLIN-ST. JEOR: SCORE: 1891.22

## 2020-02-28 NOTE — PATIENT INSTRUCTIONS
"  Patient Education     Dysuria     Painful urination (dysuria) is often caused by a problem in the urinary tract.   Dysuria is pain felt during urination. It is often described as a burning. Learn more about this problem and how it can be treated.  What causes dysuria?  Possible causes include:    Infection with a bacteria or virus such as a urinary tract infection (UTI or a sexually transmitted infection (STI)    Sensitivity or allergy to chemicals such as those found in lotions and other products    Prostate or bladder problems    Radiation therapy to the pelvic area  How is dysuria diagnosed?  Your healthcare provider will examine you. He or she will ask about your symptoms and health. After talking with you and doing a physical exam, your healthcare provider may know what is causing your dysuria. He or she will usually request  a sample of your urine. Tests of your urine, or a \"urinalysis,\" are done. A urinalysis may include:    Looking at the urine sample (visual exam)    Checking for substances (chemical exam)    Looking at a small amount under a microscope (microscopic exam)  Some parts of the urinalysis may be done in the provider's office and some in a lab. And, the urine sample may be checked for bacteria and yeast (urine culture). Your healthcare provider will tell you more about these tests if they are needed.  How is dysuria treated?  Treatment depends on the cause. If you have a bacterial infection, you may need antibiotics. You may be given medicines to make it easier for you to urinate and help relieve pain. Your healthcare provider can tell you more about your treatment options. Untreated, symptoms may get worse.  When to call your healthcare provider  Call the healthcare provider right away if you have any of the following:    Fever of 100.4 F (38 C) or higher     No improvement after three days of treatment    Trouble urinating because of pain    New or increased discharge from the vagina or " penis    Rash or joint pain    Increased back or abdominal pain    Enlarged painful lymph nodes (lumps) in the groinTh   Date Last Reviewed: 1/1/2017 2000-2019 [employer]. [address] [place] [address], [place] [address]. All rights reserved. This information is not intended as a substitute for professional medical care. Always follow your healthcare professional's instructions.

## 2020-02-28 NOTE — PROGRESS NOTES
SUBJECTIVE:   Suraj Hawthorne is a 19 year old male presenting with a chief complaint of   Chief Complaint   Patient presents with     Urinary Problem     frequency and groin pain on the LT side x 2-3 days- Has had other urinary symptoms on & off since Oct 2020     He is an established patient of [facility].  Patient presents with UTI symptoms x 3 days.  Mom present and denies any sexual activity and has UTIs in past.  No fevers or back pain.      UTI  Onset of symptoms was 3day(s).  Course of illness is same  Severity moderate  Current and associated symptoms dysuria, frequency and urgency  Treatment and measures tried None  Predisposing factors include history of frequent UTI's  Patient denies rigors, flank pain, temperature > 101 degrees F. and vomiting      Review of Systems   Constitutional: Negative for fever.   Genitourinary: Positive for dysuria and frequency.   Musculoskeletal: Negative for back pain.   All other systems reviewed and are negative.    Past Medical History:   Diagnosis Date     Depressive disorder 8/31/2010    Suraj started taking Celexa for MARK.     Pervasive developmental disorder      Subclinical hypothyroidism 5/11/2015     Family History   Problem Relation Age of Onset     Allergies Mother      Depression Mother      Eye Disorder Mother      Thyroid Disease Mother         graves     Depression/Anxiety Mother         MARK     Thyroid Disease Mother         Graves     Asthma Mother      Anxiety Disorder Mother      Allergies Maternal Grandmother      Arthritis Maternal Grandmother      Gynecology Maternal Grandmother         hyst     Osteoporosis Maternal Grandmother      Arthritis Paternal Grandmother      Depression Paternal Grandmother      Eye Disorder Paternal Grandmother         cataracts     Depression Paternal Grandfather      Thyroid Disease Paternal Grandfather      Diabetes Paternal Grandfather      Hypertension Paternal Grandfather      Depression Maternal Grandfather   "    Current Outpatient Medications   Medication Sig Dispense Refill     cholecalciferol (VITAMIN D3) 1000 UNIT tablet Take 1 tablet (1,000 Units) by mouth daily (Patient taking differently: Take 1,000 Units by mouth daily 2 tablets daily)       citalopram (CELEXA) 20 MG tablet Take 10 mg by mouth daily        guanFACINE (TENEX) 1 MG tablet Take 2 mg by mouth 2 times daily        risperiDONE (RISPERDAL) 1 MG tablet 1.5 mg  5     Social History     Tobacco Use     Smoking status: Never Smoker     Smokeless tobacco: Never Used   Substance Use Topics     Alcohol use: No     OBJECTIVE  /71   Pulse 71   Temp 96.6  F (35.9  C) (Tympanic)   Ht 1.981 m (6' 6\")   Wt 75.3 kg (166 lb)   BMI 19.18 kg/m      Physical Exam  Vitals signs and nursing note reviewed.   Constitutional:       General: He is not in acute distress.     Appearance: Normal appearance. He is normal weight. He is not ill-appearing, toxic-appearing or diaphoretic.   HENT:      Head: Normocephalic and atraumatic.   Eyes:      Extraocular Movements: Extraocular movements intact.      Conjunctiva/sclera: Conjunctivae normal.   Cardiovascular:      Rate and Rhythm: Normal rate and regular rhythm.      Pulses: Normal pulses.      Heart sounds: Normal heart sounds.   Pulmonary:      Effort: Pulmonary effort is normal.      Breath sounds: Normal breath sounds.   Abdominal:      Tenderness: There is no right CVA tenderness or left CVA tenderness.   Skin:     General: Skin is warm and dry.      Capillary Refill: Capillary refill takes less than 2 seconds.   Neurological:      General: No focal deficit present.      Mental Status: He is alert.   Psychiatric:         Mood and Affect: Mood normal.         Behavior: Behavior normal.       Labs:  Results for orders placed or performed in visit on 02/28/20 (from the past 24 hour(s))   *UA reflex to Microscopic and Culture ([facility] (except [place] and [place])   Result Value Ref Range    " Color Urine Yellow     Appearance Urine Clear     Glucose Urine Negative NEG^Negative mg/dL    Bilirubin Urine Negative NEG^Negative    Ketones Urine Negative NEG^Negative mg/dL    Specific Gravity Urine 1.010 1.003 - 1.035    Blood Urine Negative NEG^Negative    pH Urine 6.0 5.0 - 7.0 pH    Protein Albumin Urine Negative NEG^Negative mg/dL    Urobilinogen Urine 0.2 0.2 - 1.0 EU/dL    Nitrite Urine Negative NEG^Negative    Leukocyte Esterase Urine Negative NEG^Negative    Source Midstream Urine        X-Ray was not done.    ASSESSMENT:      ICD-10-CM    1. Urinary frequency R35.0 *UA reflex to Microscopic and Culture ([facility] (except [place] and [place])        Medical Decision Making:    Differential Diagnosis:  UTI: UTI and Kidney Stone    Serious Comorbid Conditions:  Adult:  None    PLAN:    UTI Adult:  Symptomatic care.  Await cultures      Followup:    If not improving or if condition worsens, follow up with your Primary Care Provider, If not improving or if conditions worsens over the next 12-24 hours, go to the Emergency Department    Patient Instructions       Patient Education     Dysuria     Painful urination (dysuria) is often caused by a problem in the urinary tract.   Dysuria is pain felt during urination. It is often described as a burning. Learn more about this problem and how it can be treated.  What causes dysuria?  Possible causes include:    Infection with a bacteria or virus such as a urinary tract infection (UTI or a sexually transmitted infection (STI)    Sensitivity or allergy to chemicals such as those found in lotions and other products    Prostate or bladder problems    Radiation therapy to the pelvic area  How is dysuria diagnosed?  Your healthcare provider will examine you. He or she will ask about your symptoms and health. After talking with you and doing a physical exam, your healthcare provider may know what is causing your dysuria. He or she will usually  "request  a sample of your urine. Tests of your urine, or a \"urinalysis,\" are done. A urinalysis may include:    Looking at the urine sample (visual exam)    Checking for substances (chemical exam)    Looking at a small amount under a microscope (microscopic exam)  Some parts of the urinalysis may be done in the provider's office and some in a lab. And, the urine sample may be checked for bacteria and yeast (urine culture). Your healthcare provider will tell you more about these tests if they are needed.  How is dysuria treated?  Treatment depends on the cause. If you have a bacterial infection, you may need antibiotics. You may be given medicines to make it easier for you to urinate and help relieve pain. Your healthcare provider can tell you more about your treatment options. Untreated, symptoms may get worse.  When to call your healthcare provider  Call the healthcare provider right away if you have any of the following:    Fever of 100.4 F (38 C) or higher     No improvement after three days of treatment    Trouble urinating because of pain    New or increased discharge from the vagina or penis    Rash or joint pain    Increased back or abdominal pain    Enlarged painful lymph nodes (lumps) in the groinTh   Date Last Reviewed: 1/1/2017 2000-2019 [employer]. [address] [place] [address], [place] [address]. All rights reserved. This information is not intended as a substitute for professional medical care. Always follow your healthcare professional's instructions.                 "

## 2020-07-01 ENCOUNTER — MYC MEDICAL ADVICE (OUTPATIENT)
Dept: FAMILY MEDICINE | Facility: CLINIC | Age: 20
End: 2020-07-01

## 2020-07-01 NOTE — LETTER
"            [facility]  [address] [facility] [address]  Phone: [phone]    07/03/20    Suraj Hawthorne  [address] [facility] [address]      To whom it may concern:     Physician Recommendation Letter for Suraj Hawthorne    I recommend purchasing a [employer] 7i elliptical bicycle ([email or web address]) for Suraj through his waiver. The goal is to provide an inside option for initial use and proficiency before navigating bike paths and side streets and for consistent exercise regardless of inclement weather conditions. Considering recent health concerns and cancellations/restrictions of sports activities, I feel that having a portable, adaptable bicycle option will be a safer exercise equipment option. This particular model will accommodate Suraj's above-average height, will allow him to improve his balance, and will provide for participation in biking activities with others.      I think this is an excellent idea and would be beneficial for my patient in relation to his diagnosed Autism Spectrum Disorder and Generalized Anxiety Disorder.    Wt Readings from Last 2 Encounters:   02/28/20 75.3 kg (166 lb)   02/10/20 75.3 kg (166 lb)     Ht Readings from Last 2 Encounters:   02/28/20 1.981 m (6' 6\")   02/10/20 1.988 m (6' 6.25\")       Sincerely,      Gopal Lombardo MD            "

## 2020-07-02 NOTE — TELEPHONE ENCOUNTER
I think that this is an excellent idea for Suraj Welch,  Can you get a letter started regarding this and I will add to it tomorrow and make a final draft.  Gopal Lombardo MD

## 2020-12-13 ENCOUNTER — HEALTH MAINTENANCE LETTER (OUTPATIENT)
Age: 20
End: 2020-12-13

## 2021-04-11 ENCOUNTER — HEALTH MAINTENANCE LETTER (OUTPATIENT)
Age: 21
End: 2021-04-11

## 2021-09-26 ENCOUNTER — HEALTH MAINTENANCE LETTER (OUTPATIENT)
Age: 21
End: 2021-09-26

## 2022-01-04 ASSESSMENT — ENCOUNTER SYMPTOMS
HEADACHES: 0
NAUSEA: 0
HEMATURIA: 0
ARTHRALGIAS: 0
ABDOMINAL PAIN: 0
FEVER: 0
DYSURIA: 0
JOINT SWELLING: 0
CHILLS: 0
CONSTIPATION: 1
WEAKNESS: 0
FREQUENCY: 0
COUGH: 0
EYE PAIN: 0
SHORTNESS OF BREATH: 0
SORE THROAT: 0
PALPITATIONS: 0
HEARTBURN: 0
DIARRHEA: 0
MYALGIAS: 0
DIZZINESS: 0
NERVOUS/ANXIOUS: 1
HEMATOCHEZIA: 0
PARESTHESIAS: 0

## 2022-01-05 ENCOUNTER — OFFICE VISIT (OUTPATIENT)
Dept: FAMILY MEDICINE | Facility: CLINIC | Age: 22
End: 2022-01-05
Payer: COMMERCIAL

## 2022-01-05 VITALS
HEART RATE: 71 BPM | TEMPERATURE: 97 F | OXYGEN SATURATION: 99 % | RESPIRATION RATE: 18 BRPM | HEIGHT: 78 IN | SYSTOLIC BLOOD PRESSURE: 114 MMHG | BODY MASS INDEX: 19.67 KG/M2 | DIASTOLIC BLOOD PRESSURE: 65 MMHG | WEIGHT: 170 LBS

## 2022-01-05 DIAGNOSIS — Z23 HIGH PRIORITY FOR 2019-NCOV VACCINE: ICD-10-CM

## 2022-01-05 DIAGNOSIS — E03.8 SUBCLINICAL HYPOTHYROIDISM: ICD-10-CM

## 2022-01-05 DIAGNOSIS — Z00.00 ROUTINE GENERAL MEDICAL EXAMINATION AT A HEALTH CARE FACILITY: Primary | ICD-10-CM

## 2022-01-05 DIAGNOSIS — Z23 NEED FOR PROPHYLACTIC VACCINATION AND INOCULATION AGAINST INFLUENZA: ICD-10-CM

## 2022-01-05 DIAGNOSIS — E55.9 VITAMIN D DEFICIENCY: ICD-10-CM

## 2022-01-05 DIAGNOSIS — Z11.59 ENCOUNTER FOR HEPATITIS C SCREENING TEST FOR LOW RISK PATIENT: ICD-10-CM

## 2022-01-05 LAB — TSH SERPL DL<=0.005 MIU/L-ACNC: 2.96 MU/L (ref 0.4–4)

## 2022-01-05 PROCEDURE — 91306 COVID-19,PF,MODERNA (18+ YRS BOOSTER .25ML): CPT | Performed by: FAMILY MEDICINE

## 2022-01-05 PROCEDURE — 84443 ASSAY THYROID STIM HORMONE: CPT | Performed by: FAMILY MEDICINE

## 2022-01-05 PROCEDURE — 90471 IMMUNIZATION ADMIN: CPT | Performed by: FAMILY MEDICINE

## 2022-01-05 PROCEDURE — 36415 COLL VENOUS BLD VENIPUNCTURE: CPT | Performed by: FAMILY MEDICINE

## 2022-01-05 PROCEDURE — 82306 VITAMIN D 25 HYDROXY: CPT | Performed by: FAMILY MEDICINE

## 2022-01-05 PROCEDURE — 0064A COVID-19,PF,MODERNA (18+ YRS BOOSTER .25ML): CPT | Performed by: FAMILY MEDICINE

## 2022-01-05 PROCEDURE — 90686 IIV4 VACC NO PRSV 0.5 ML IM: CPT | Performed by: FAMILY MEDICINE

## 2022-01-05 PROCEDURE — 86803 HEPATITIS C AB TEST: CPT | Performed by: FAMILY MEDICINE

## 2022-01-05 PROCEDURE — 99395 PREV VISIT EST AGE 18-39: CPT | Mod: 25 | Performed by: FAMILY MEDICINE

## 2022-01-05 RX ORDER — CHOLECALCIFEROL (VITAMIN D3) 50 MCG
2 TABLET ORAL DAILY
COMMUNITY
Start: 2022-01-05 | End: 2022-01-06

## 2022-01-05 ASSESSMENT — PAIN SCALES - GENERAL: PAINLEVEL: NO PAIN (0)

## 2022-01-05 ASSESSMENT — ENCOUNTER SYMPTOMS
EYE PAIN: 0
SHORTNESS OF BREATH: 0
CHILLS: 0
HEMATOCHEZIA: 0
NERVOUS/ANXIOUS: 1
HEARTBURN: 0
SORE THROAT: 0
HEMATURIA: 0
NAUSEA: 0
CONSTIPATION: 1
FEVER: 0
PARESTHESIAS: 0
DIARRHEA: 0
DIZZINESS: 0
ARTHRALGIAS: 0
ABDOMINAL PAIN: 0
WEAKNESS: 0
MYALGIAS: 0
JOINT SWELLING: 0
COUGH: 0
DYSURIA: 0
PALPITATIONS: 0
HEADACHES: 0
FREQUENCY: 0

## 2022-01-05 ASSESSMENT — MIFFLIN-ST. JEOR: SCORE: 1907.3

## 2022-01-05 NOTE — NURSING NOTE
"Chief Complaint   Patient presents with     Physical       Initial /65   Pulse 71   Temp 97  F (36.1  C) (Tympanic)   Resp 18   Ht 1.994 m (6' 6.5\")   Wt 77.1 kg (170 lb)   SpO2 99%   BMI 19.40 kg/m   Estimated body mass index is 19.4 kg/m  as calculated from the following:    Height as of this encounter: 1.994 m (6' 6.5\").    Weight as of this encounter: 77.1 kg (170 lb).  Medication Reconciliation: complete  Dora Almeida CMA  "

## 2022-01-05 NOTE — PROGRESS NOTES
SUBJECTIVE:   CC: Suraj Hawthorne is an 21 year old male who presents for preventative health visit.       Patient has been advised of split billing requirements and indicates understanding: Yes  Healthy Habits:     Getting at least 3 servings of Calcium per day:  Yes    Bi-annual eye exam:  Yes    Dental care twice a year:  Yes    Sleep apnea or symptoms of sleep apnea:  None    Diet:  Regular (no restrictions)    Frequency of exercise:  2-3 days/week    Duration of exercise:  30-45 minutes    Taking medications regularly:  Yes    Medication side effects:  None    PHQ-2 Total Score: 1    Additional concerns today:  No              Today's PHQ-2 Score:   PHQ-2 ( 1999 Pfizer) 1/4/2022   Q1: Little interest or pleasure in doing things 1   Q2: Feeling down, depressed or hopeless 0   PHQ-2 Score 1   PHQ-2 Total Score (12-17 Years)- Positive if 3 or more points; Administer PHQ-A if positive -   Q1: Little interest or pleasure in doing things Several days   Q2: Feeling down, depressed or hopeless Not at all   PHQ-2 Score 1       Abuse: Current or Past(Physical, Sexual or Emotional)- No  Do you feel safe in your environment? Yes        Social History     Tobacco Use     Smoking status: Never Smoker     Smokeless tobacco: Never Used   Substance Use Topics     Alcohol use: No     If you drink alcohol do you typically have >3 drinks per day or >7 drinks per week? No    Alcohol Use 1/4/2022   Prescreen: >3 drinks/day or >7 drinks/week? Not Applicable   Prescreen: >3 drinks/day or >7 drinks/week? -       Last PSA: No results found for: PSA    Reviewed orders with patient. Reviewed health maintenance and updated orders accordingly -       Reviewed and updated as needed this visit by clinical staff                Reviewed and updated as needed this visit by Provider                   Review of Systems   Constitutional: Negative for chills and fever.   HENT: Negative for congestion, ear pain, hearing loss and sore throat.     Eyes: Negative for pain and visual disturbance.   Respiratory: Negative for cough and shortness of breath.    Cardiovascular: Negative for chest pain, palpitations and peripheral edema.   Gastrointestinal: Positive for constipation. Negative for abdominal pain, diarrhea, heartburn, hematochezia and nausea.   Genitourinary: Negative for dysuria, frequency, genital sores, hematuria, impotence, penile discharge and urgency.   Musculoskeletal: Negative for arthralgias, joint swelling and myalgias.   Skin: Negative for rash.   Neurological: Negative for dizziness, weakness, headaches and paresthesias.   Psychiatric/Behavioral: Positive for mood changes. The patient is nervous/anxious.          OBJECTIVE:   There were no vitals taken for this visit.    Physical Exam          ASSESSMENT/PLAN:       ICD-10-CM    1. Routine general medical examination at a health care facility  Z00.00    2. High priority for 2019-nCoV vaccine  Z23 COVID-19,PF,MODERNA (18+ Yrs BOOSTER .25mL)   3. Vitamin D deficiency  E55.9 Vitamin D Deficiency     vitamin D3 (CHOLECALCIFEROL) 50 mcg (2000 units) tablet   4. Subclinical hypothyroidism  E03.8 TSH WITH FREE T4 REFLEX   5. Encounter for hepatitis C screening test for low risk patient  Z11.59 Hepatitis C Screen Reflex to HCV RNA Quant and Genotype   6. Need for prophylactic vaccination and inoculation against influenza  Z23        Patient has been advised of split billing requirements and indicates understanding: Yes  COUNSELING:   Reviewed preventive health counseling, as reflected in patient instructions       Regular exercise       Healthy diet/nutrition       Vision screening       Hearing screening       Family planning       Consider Hep C screening for all patients one time for ages 18-79 years       HIV screeninx in teen years, 1x in adult years, and at intervals if high risk       Osteoporosis prevention/bone health    Estimated body mass index is 19.18 kg/m  as calculated from the  "following:    Height as of 2/28/20: 1.981 m (6' 6\").    Weight as of 2/28/20: 75.3 kg (166 lb).         He reports that he has never smoked. He has never used smokeless tobacco.      Counseling Resources:  ATP IV Guidelines  Pooled Cohorts Equation Calculator  FRAX Risk Assessment  ICSI Preventive Guidelines  Dietary Guidelines for Americans, 2010  [employer]'s MyPlate  ASA Prophylaxis  Lung CA Screening    Gopal Lombardo MD  [facility]  --------------------------------------------------------------------------------------------------------------------------------------  SUBJECTIVE:  Suraj Hawthorne is a 21 year old male who presents to the clinic today for a routine physical exam.    The patient's last physical was  2-10-20    Cholesterol   Date Value Ref Range Status   02/17/2020 186 <200 mg/dL Final   04/08/2015 163 <170 mg/dL Final     Comment:     LDL Cholesterol is the primary guide to therapy.   The NCEP recommends further evaluation of: patients with cholesterol greater   than 200 mg/dL if additional risk factors are present, cholesterol greater   than   240 mg/dL, triglycerides greater than 150 mg/dL, or HDL less than 40 mg/dL.       HDL Cholesterol   Date Value Ref Range Status   02/17/2020 59 >39 mg/dL Final   04/08/2015 67 >45 mg/dL Final     LDL Cholesterol Calculated   Date Value Ref Range Status   02/17/2020 112 (H) <100 mg/dL Final     Comment:     Above desirable:  100-129 mg/dl  Borderline High:  130-159 mg/dL  High:             160-189 mg/dL  Very high:       >189 mg/dl     04/08/2015 83 0 - 129 mg/dL Final     Comment:     LDL Cholesterol is the primary guide to therapy: LDL-cholesterol goal in high   risk patients is <100 mg/dL and in very high risk patients is <70 mg/dL.       Triglycerides   Date Value Ref Range Status   02/17/2020 76 <150 mg/dL Final     Comment:     Fasting specimen   04/08/2015 64 0 - 150 mg/dL Final     Cholesterol/HDL Ratio   Date Value Ref Range " Status   04/08/2015 2.4 0.0 - 5.0 Final   04/09/2014 2.9 0.0 - 5.0 Final     The patient's last fasting lipid panel was done 1 years ago and the results are listed above.      The ASCVD Risk score (Shell LOPEZ Jr., et al., 2013) failed to calculate for the following reasons:    The 2013 ASCVD risk score is only valid for ages 40 to 79      Risk Enhancers:  Family history of premature ASCVD- No  LDL >159- No  Chronic kidney disease- No  Metabolic Syndrome- No  Premature menopause- No  Inflammatory conditions (RA, psoriasis, HIV)- No  SE  Ancestry- No  Triglycerides >174- No      The patient reports that he has never been treated for high blood pressure.    The patient reports that he does not take a daily aspirin.    No results found for: HCVAB  The patient reports that he has not been screened for Hepatitis C    (Screen all baby boomers once per CDC-- the generation born from 1945 through 1965 and per USPTF screen age 19 to 79 especially younger people who have used IV drugs)  He would not like to have an Hepatitis C test today    No results found for: HIAGAB  The patient reports that he has not been screened for HIV   (Screen all 15 to 64 years old)  He would not like to have an HIV test today      Immunization History   Administered Date(s) Administered     COVID-19,PF,Moderna 04/13/2021, 05/11/2021     Comvax (HIB/HepB) 02/10/1999, 04/12/1999, 03/10/2000     DTAP (<7y) 02/10/1999, 04/12/1999, 06/14/1999, 03/10/2000, 08/26/2003     HEPA 10/29/2007, 10/31/2008     Influenza (IIV3) PF 10/29/2005, 12/05/2005     Influenza Vaccine IM > 6 months Valent IIV4 (Alfuria,Fluzone) 01/18/2018, 02/01/2019     MMR 03/10/2000, 08/26/2003     Meningococcal (Menactra ) 12/08/2011, 01/12/2015     Pneumo Conj 13-V (2010&after) 11/18/2010     Poliovirus, inactivated (IPV) 02/10/1999, 04/12/1999, 03/10/2000, 08/26/2003     TDAP Vaccine (Adacel) 11/06/2009, 02/01/2019     Varicella 03/10/2000, 10/29/2007     The patient's believes  that his last tetanus shot was given 2 year(s) ago.   The patient believes that he has not had a Shingrix in the past  The patient believes that he has not had a PPSV23 in the past.  The patient believes that he has had a PCV13 in the past.  The patient believes that he has not had a seasonal flu vaccination this fall or winter.  The patient would like to have a Influenza      No results found for this or any previous visit.]   The patient denies a family history of colon cancer.  The patient reports that he has not had a colonoscopy.    The patient reports that he does not performs a self testicular exam monthly.  His currently used contraception is n/a. He is not interested in a vasectomy in the near future.  The patient denies a family history of diabetes.    The patient reports that he eats or drinks 3 servings of dairy products per day.     The patient reports that he has dental appointments approximately every 6 months.  The patient denies any visual difficulty       Do you currently smoke? No  How many years have you smoked? 0   How many packs per day did you smoke on average? N/A  (if more than 30 pack year history and the patient is age 55-80 consider ordering an annual low dose radiation lung CT to screen for cancer)  (Do not order if patient has quit more than 15 years ago or has a health condition that limits life expectancy or could not tolerate curative lung surgery)  Are you interested having a lung CT to screen for lung cancer? N/A    If the patient has smoked more that 100 cigarettes, has the patient had an imaging study (US or CT) for an AAA between the ages of 65 and 75? N/A            Patient Active Problem List   Diagnosis     Pervasive developmental disorder     Generalized anxiety disorder     Vitamin D deficiency     Subclinical hypothyroidism       Past Surgical History:   Procedure Laterality Date     HERNIA REPAIR, INGUINAL RT/LT      L       Family History   Problem Relation Age of Onset      Allergies Mother      Depression Mother      Eye Disorder Mother      Thyroid Disease Mother         graves     Depression/Anxiety Mother         MARK     Thyroid Disease Mother         Graves     Asthma Mother      Anxiety Disorder Mother      Anesthesia Reaction Mother      Allergies Maternal Grandmother      Arthritis Maternal Grandmother      Gynecology Maternal Grandmother         hyst     Osteoporosis Maternal Grandmother      Arthritis Paternal Grandmother      Depression Paternal Grandmother      Eye Disorder Paternal Grandmother         cataracts     Depression Paternal Grandfather      Thyroid Disease Paternal Grandfather      Diabetes Paternal Grandfather      Hypertension Paternal Grandfather      Diabetes Father      Hypertension Father      Hyperlipidemia Father      Depression Maternal Grandfather        Social History     Socioeconomic History     Marital status: Single     Spouse name: Not on file     Number of children: Not on file     Years of education: Not on file     Highest education level: Not on file   Occupational History     Not on file   Tobacco Use     Smoking status: Never Smoker     Smokeless tobacco: Never Used   Vaping Use     Vaping Use: Never used   Substance and Sexual Activity     Alcohol use: No     Drug use: No     Sexual activity: Never   Other Topics Concern     Parent/sibling w/ CABG, MI or angioplasty before 65F 55M? No   Social History Narrative     Not on file     Social Determinants of Health     Financial Resource Strain: Not on file   Food Insecurity: Not on file   Transportation Needs: Not on file   Physical Activity: Not on file   Stress: Not on file   Social Connections: Not on file   Intimate Partner Violence: Not on file   Housing Stability: Not on file       Current Outpatient Medications   Medication Sig Dispense Refill     citalopram (CELEXA) 20 MG tablet Take 10 mg by mouth daily        guanFACINE (TENEX) 1 MG tablet Take 2 mg by mouth 2 times daily         "risperiDONE (RISPERDAL) 1 MG tablet 1.5 mg  5     vitamin D3 (CHOLECALCIFEROL) 50 mcg (2000 units) tablet Take 2 tablets (100 mcg) by mouth daily             PHYSICAL EXAMINATION:  Blood pressure 114/65, pulse 71, temperature 97  F (36.1  C), temperature source Tympanic, resp. rate 18, height 1.994 m (6' 6.5\"), weight 77.1 kg (170 lb), SpO2 99 %.  General appearance - healthy, alert and no distress  Skin - Skin color, texture, turgor normal. No rashes or lesions.  Head - Normocephalic. No masses, lesions, tenderness or abnormalities  Eyes - conjunctivae/corneas clear. PERRL, EOM's intact. Fundi benign  Ears - External ears normal. Canals clear. TM's normal.  Nose/Sinuses - Nares normal. Septum midline. Mucosa normal. No drainage or sinus tenderness.  Oropharynx - Lips, mucosa, and tongue normal. Teeth and gums normal.  Neck - Neck supple. No adenopathy. Thyroid symmetric, normal size,  Lungs - Percussion normal. Good diaphragmatic excursion. Lungs clear  Heart - PMI normal. No lifts, heaves, or thrills. RRR. No murmurs, clicks gallops or rub  Abdomen - Abdomen soft, non-tender. BS normal. No masses, organomegaly  Extremities - Extremities normal. No deformities, edema, or skin discoloration.  Musculoskeletal - Spine ROM normal. Muscular strength intact.  Peripheral pulses - radial=4/4, femoral=4/4, popliteal=4/4, dorsalis pedis=4/4,  Neuro - Gait normal. Reflexes normal and symmetric. Sensation grossly WNL.  Genitalia - Penis normal. No urethral discharge. Scrotum normal to palpation. No hernia.  Rectal - deferred      Office Visit on 02/28/2020   Component Date Value Ref Range Status     Color Urine 02/28/2020 Yellow   Final     Appearance Urine 02/28/2020 Clear   Final     Glucose Urine 02/28/2020 Negative  NEG^Negative mg/dL Final     Bilirubin Urine 02/28/2020 Negative  NEG^Negative Final     Ketones Urine 02/28/2020 Negative  NEG^Negative mg/dL Final     Specific Gravity Urine 02/28/2020 1.010  1.003 - 1.035 " Final     Blood Urine 02/28/2020 Negative  NEG^Negative Final     pH Urine 02/28/2020 6.0  5.0 - 7.0 pH Final     Protein Albumin Urine 02/28/2020 Negative  NEG^Negative mg/dL Final     Urobilinogen Urine 02/28/2020 0.2  0.2 - 1.0 EU/dL Final     Nitrite Urine 02/28/2020 Negative  NEG^Negative Final     Leukocyte Esterase Urine 02/28/2020 Negative  NEG^Negative Final     Source 02/28/2020 Midstream Urine   Final       ASSESSMENT:    ICD-10-CM    1. Routine general medical examination at a health care facility  Z00.00    2. High priority for 2019-nCoV vaccine  Z23        Well-Adult Physical Exam.  Health Maintenance Due   Topic Date Due     ANNUAL REVIEW OF HM ORDERS  Never done     HPV IMMUNIZATION (1 - Male 2-dose series) Never done     HIV SCREENING  Never done     HEPATITIS C SCREENING  Never done     TSH W/FREE T4 REFLEX  02/17/2021     INFLUENZA VACCINE (1) 09/01/2021     COVID-19 Vaccine (3 - Booster for Moderna series) 11/11/2021     Health Maintenance   Topic Date Due     ANNUAL REVIEW OF HM ORDERS  Never done     HPV IMMUNIZATION (1 - Male 2-dose series) Never done     HIV SCREENING  Never done     HEPATITIS C SCREENING  Never done     TSH W/FREE T4 REFLEX  02/17/2021     INFLUENZA VACCINE (1) 09/01/2021     COVID-19 Vaccine (3 - Booster for Moderna series) 11/11/2021     ADVANCE CARE PLANNING  01/05/2022 (Originally 1998)     PREVENTIVE CARE VISIT  01/05/2023     DTAP/TDAP/TD IMMUNIZATION (5 - Td or Tdap) 02/01/2029     PHQ-2  Completed     IPV IMMUNIZATION  Completed     MENINGITIS IMMUNIZATION  Completed     HEPATITIS B IMMUNIZATION  Completed     Pneumococcal Vaccine: Pediatrics (0 to 5 Years) and At-Risk Patients (6 to 64 Years)  Aged Out         HEALTH CARE MAINTENENCE: The recommended screening tests and vaccinatons for this patient have been discussed as above.  The appropriate tests and vaccinations  have been ordered or declined by the patient. Please see the orders in EPIC.The patient  specifically declines: n/a     Immunization Status:  up to date and documented except for influenza and COVID booster    Patient Active Problem List   Diagnosis     Pervasive developmental disorder     Generalized anxiety disorder     Vitamin D deficiency     Subclinical hypothyroidism        ATP III Guidelines  ICSI Preventive Guidelines    PLAN:   Hepatitis C antibody ordered  HIV testing was discussed but the pt declined  Flu shot recommended  COVID booster vaccine recommended  Discussed calcium intake, vitamins and supplements. Recommended 1000 mg of calcium daily  Sunscreen use was recommended especially in the area of tatoos  Recommended dental exams every 6 months  Follow up in 1 year for the next preventative medical visit        Body mass index is 19.4 kg/m .      (E55.9) Vitamin D deficiency  Comment:   Plan: Vitamin D Deficiency, vitamin D3         (CHOLECALCIFEROL) 50 mcg (2000 units) tablet            (E03.8) Subclinical hypothyroidism  Comment:   Plan: TSH WITH FREE T4 REFLEX            (Z11.59) Encounter for hepatitis C screening test for low risk patient  Comment:   Plan: Hepatitis C Screen Reflex to HCV RNA Quant and         Genotype

## 2022-01-06 DIAGNOSIS — E55.9 VITAMIN D DEFICIENCY: ICD-10-CM

## 2022-01-06 LAB
DEPRECATED CALCIDIOL+CALCIFEROL SERPL-MC: 107 UG/L (ref 20–75)
HCV AB SERPL QL IA: NONREACTIVE

## 2022-01-06 RX ORDER — CHOLECALCIFEROL (VITAMIN D3) 50 MCG
1 TABLET ORAL DAILY
COMMUNITY
Start: 2022-01-06 | End: 2022-11-28

## 2022-04-06 ENCOUNTER — LAB (OUTPATIENT)
Dept: LAB | Facility: CLINIC | Age: 22
End: 2022-04-06
Payer: COMMERCIAL

## 2022-04-06 DIAGNOSIS — E55.9 VITAMIN D DEFICIENCY: ICD-10-CM

## 2022-04-06 PROCEDURE — 36415 COLL VENOUS BLD VENIPUNCTURE: CPT

## 2022-04-06 PROCEDURE — 82306 VITAMIN D 25 HYDROXY: CPT

## 2022-04-11 LAB
DEPRECATED CALCIDIOL+CALCIFEROL SERPL-MC: <75 UG/L (ref 20–75)
VITAMIN D2 SERPL-MCNC: <5 UG/L
VITAMIN D3 SERPL-MCNC: 70 UG/L

## 2022-04-11 NOTE — RESULT ENCOUNTER NOTE
Suraj,  I have reviewed the results of the laboratory tests that we recently ordered. All of the lab work performed was normal or considered normal for you.  Sincerely,   Gopal Lombardo MD

## 2022-10-15 ENCOUNTER — LAB (OUTPATIENT)
Dept: LAB | Facility: CLINIC | Age: 22
End: 2022-10-15
Payer: COMMERCIAL

## 2022-10-15 DIAGNOSIS — F41.1 GENERALIZED ANXIETY DISORDER: ICD-10-CM

## 2022-10-15 DIAGNOSIS — F41.1 GENERALIZED ANXIETY DISORDER: Primary | ICD-10-CM

## 2022-10-15 LAB
BASOPHILS # BLD AUTO: 0 10E3/UL (ref 0–0.2)
BASOPHILS NFR BLD AUTO: 1 %
EOSINOPHIL # BLD AUTO: 0.1 10E3/UL (ref 0–0.7)
EOSINOPHIL NFR BLD AUTO: 1 %
ERYTHROCYTE [DISTWIDTH] IN BLOOD BY AUTOMATED COUNT: 12.8 % (ref 10–15)
HBA1C MFR BLD: 5.3 % (ref 0–5.6)
HCT VFR BLD AUTO: 43.4 % (ref 40–53)
HGB BLD-MCNC: 14.7 G/DL (ref 13.3–17.7)
IMM GRANULOCYTES # BLD: 0 10E3/UL
IMM GRANULOCYTES NFR BLD: 0 %
LYMPHOCYTES # BLD AUTO: 1.6 10E3/UL (ref 0.8–5.3)
LYMPHOCYTES NFR BLD AUTO: 29 %
MCH RBC QN AUTO: 30.2 PG (ref 26.5–33)
MCHC RBC AUTO-ENTMCNC: 33.9 G/DL (ref 31.5–36.5)
MCV RBC AUTO: 89 FL (ref 78–100)
MONOCYTES # BLD AUTO: 0.4 10E3/UL (ref 0–1.3)
MONOCYTES NFR BLD AUTO: 7 %
NEUTROPHILS # BLD AUTO: 3.6 10E3/UL (ref 1.6–8.3)
NEUTROPHILS NFR BLD AUTO: 63 %
PLATELET # BLD AUTO: 245 10E3/UL (ref 150–450)
PROLACTIN SERPL 3RD IS-MCNC: 22 NG/ML (ref 4–15)
RBC # BLD AUTO: 4.87 10E6/UL (ref 4.4–5.9)
T3 SERPL-MCNC: 141 NG/DL (ref 85–202)
T3FREE SERPL-MCNC: 3.8 PG/ML (ref 2–4.4)
T4 SERPL-MCNC: 11.3 UG/DL (ref 4.5–11.7)
WBC # BLD AUTO: 5.7 10E3/UL (ref 4–11)

## 2022-10-15 PROCEDURE — 84481 FREE ASSAY (FT-3): CPT

## 2022-10-15 PROCEDURE — 84146 ASSAY OF PROLACTIN: CPT

## 2022-10-15 PROCEDURE — 84436 ASSAY OF TOTAL THYROXINE: CPT

## 2022-10-15 PROCEDURE — 82306 VITAMIN D 25 HYDROXY: CPT

## 2022-10-15 PROCEDURE — 82728 ASSAY OF FERRITIN: CPT

## 2022-10-15 PROCEDURE — 80050 GENERAL HEALTH PANEL: CPT

## 2022-10-15 PROCEDURE — 36415 COLL VENOUS BLD VENIPUNCTURE: CPT

## 2022-10-15 PROCEDURE — 83540 ASSAY OF IRON: CPT

## 2022-10-15 PROCEDURE — 83036 HEMOGLOBIN GLYCOSYLATED A1C: CPT

## 2022-10-15 PROCEDURE — 83550 IRON BINDING TEST: CPT

## 2022-10-17 LAB
ALBUMIN SERPL-MCNC: 4.9 G/DL (ref 3.4–5)
ALP SERPL-CCNC: 49 U/L (ref 40–150)
ALT SERPL W P-5'-P-CCNC: 17 U/L (ref 0–70)
ANION GAP SERPL CALCULATED.3IONS-SCNC: 6 MMOL/L (ref 3–14)
AST SERPL W P-5'-P-CCNC: 16 U/L (ref 0–45)
BILIRUB SERPL-MCNC: 0.8 MG/DL (ref 0.2–1.3)
BUN SERPL-MCNC: 13 MG/DL (ref 7–30)
CALCIUM SERPL-MCNC: 9.4 MG/DL (ref 8.5–10.1)
CHLORIDE BLD-SCNC: 109 MMOL/L (ref 94–109)
CO2 SERPL-SCNC: 26 MMOL/L (ref 20–32)
CREAT SERPL-MCNC: 1.06 MG/DL (ref 0.66–1.25)
DEPRECATED CALCIDIOL+CALCIFEROL SERPL-MC: 80 UG/L (ref 20–75)
FASTING STATUS PATIENT QL REPORTED: YES
FERRITIN SERPL-MCNC: 154 NG/ML (ref 26–388)
GFR SERPL CREATININE-BSD FRML MDRD: >90 ML/MIN/1.73M2
GLUCOSE BLD-MCNC: 96 MG/DL (ref 70–99)
GLUCOSE BLD-MCNC: 96 MG/DL (ref 70–99)
IRON SATN MFR SERPL: 33 % (ref 15–46)
IRON SERPL-MCNC: 97 UG/DL (ref 35–180)
POTASSIUM BLD-SCNC: 4.6 MMOL/L (ref 3.4–5.3)
PROT SERPL-MCNC: 7.3 G/DL (ref 6.8–8.8)
SODIUM SERPL-SCNC: 141 MMOL/L (ref 133–144)
TIBC SERPL-MCNC: 296 UG/DL (ref 240–430)
TSH SERPL DL<=0.005 MIU/L-ACNC: 2.94 MU/L (ref 0.4–4)

## 2022-11-21 ENCOUNTER — TELEPHONE (OUTPATIENT)
Dept: FAMILY MEDICINE | Facility: CLINIC | Age: 22
End: 2022-11-21

## 2022-11-28 ENCOUNTER — OFFICE VISIT (OUTPATIENT)
Dept: FAMILY MEDICINE | Facility: CLINIC | Age: 22
End: 2022-11-28
Payer: COMMERCIAL

## 2022-11-28 VITALS
DIASTOLIC BLOOD PRESSURE: 77 MMHG | HEIGHT: 77 IN | RESPIRATION RATE: 20 BRPM | SYSTOLIC BLOOD PRESSURE: 114 MMHG | WEIGHT: 171.2 LBS | TEMPERATURE: 98.2 F | BODY MASS INDEX: 20.21 KG/M2 | HEART RATE: 76 BPM | OXYGEN SATURATION: 96 %

## 2022-11-28 DIAGNOSIS — Z23 NEED FOR HPV VACCINATION: ICD-10-CM

## 2022-11-28 DIAGNOSIS — F41.1 GENERALIZED ANXIETY DISORDER: ICD-10-CM

## 2022-11-28 DIAGNOSIS — Z00.00 ROUTINE GENERAL MEDICAL EXAMINATION AT A HEALTH CARE FACILITY: Primary | ICD-10-CM

## 2022-11-28 DIAGNOSIS — Z11.1 SCREENING EXAMINATION FOR PULMONARY TUBERCULOSIS: ICD-10-CM

## 2022-11-28 DIAGNOSIS — Z23 NEED FOR COVID-19 VACCINE: ICD-10-CM

## 2022-11-28 DIAGNOSIS — E03.8 SUBCLINICAL HYPOTHYROIDISM: ICD-10-CM

## 2022-11-28 DIAGNOSIS — F84.0 AUTISM SPECTRUM DISORDER: ICD-10-CM

## 2022-11-28 DIAGNOSIS — Z23 NEED FOR PROPHYLACTIC VACCINATION AND INOCULATION AGAINST INFLUENZA: ICD-10-CM

## 2022-11-28 LAB — TSH SERPL DL<=0.005 MIU/L-ACNC: 3.56 MU/L (ref 0.4–4)

## 2022-11-28 PROCEDURE — 90471 IMMUNIZATION ADMIN: CPT | Performed by: FAMILY MEDICINE

## 2022-11-28 PROCEDURE — 36415 COLL VENOUS BLD VENIPUNCTURE: CPT | Performed by: FAMILY MEDICINE

## 2022-11-28 PROCEDURE — 91313 COVID-19 VACCINE BIVALENT BOOSTER 18+ (MODERNA): CPT | Performed by: FAMILY MEDICINE

## 2022-11-28 PROCEDURE — 90472 IMMUNIZATION ADMIN EACH ADD: CPT | Performed by: FAMILY MEDICINE

## 2022-11-28 PROCEDURE — 99395 PREV VISIT EST AGE 18-39: CPT | Mod: 25 | Performed by: FAMILY MEDICINE

## 2022-11-28 PROCEDURE — 0134A COVID-19 VACCINE BIVALENT BOOSTER 18+ (MODERNA): CPT | Performed by: FAMILY MEDICINE

## 2022-11-28 PROCEDURE — 90686 IIV4 VACC NO PRSV 0.5 ML IM: CPT | Performed by: FAMILY MEDICINE

## 2022-11-28 PROCEDURE — 90651 9VHPV VACCINE 2/3 DOSE IM: CPT | Performed by: FAMILY MEDICINE

## 2022-11-28 PROCEDURE — 86481 TB AG RESPONSE T-CELL SUSP: CPT | Performed by: FAMILY MEDICINE

## 2022-11-28 PROCEDURE — 84443 ASSAY THYROID STIM HORMONE: CPT | Performed by: FAMILY MEDICINE

## 2022-11-28 RX ORDER — GUANFACINE 2 MG/1
2 TABLET ORAL DAILY
COMMUNITY
Start: 2019-08-01 | End: 2023-05-12

## 2022-11-28 RX ORDER — OLANZAPINE 10 MG/1
10 TABLET ORAL AT BEDTIME
COMMUNITY
Start: 2022-10-31

## 2022-11-28 RX ORDER — CITALOPRAM HYDROBROMIDE 10 MG/1
10 TABLET ORAL DAILY
COMMUNITY
Start: 2019-08-01 | End: 2023-05-12

## 2022-11-28 RX ORDER — OLANZAPINE 5 MG/1
5 TABLET ORAL 2 TIMES DAILY
COMMUNITY
Start: 2022-11-22

## 2022-11-28 ASSESSMENT — ENCOUNTER SYMPTOMS
HEARTBURN: 0
EYE PAIN: 0
WEAKNESS: 0
DIARRHEA: 0
CHILLS: 0
HEADACHES: 0
HEMATOCHEZIA: 0
PALPITATIONS: 0
FREQUENCY: 0
DIZZINESS: 0
SHORTNESS OF BREATH: 0
CONSTIPATION: 1
COUGH: 0
FEVER: 0
ARTHRALGIAS: 0
ABDOMINAL PAIN: 0
SORE THROAT: 0
DYSURIA: 0
JOINT SWELLING: 0
NAUSEA: 0
PARESTHESIAS: 0
NERVOUS/ANXIOUS: 1
MYALGIAS: 0
HEMATURIA: 0

## 2022-11-28 ASSESSMENT — PAIN SCALES - GENERAL: PAINLEVEL: NO PAIN (0)

## 2022-11-28 NOTE — PROGRESS NOTES
SUBJECTIVE:   CC: Suraj is an 21 year old who presents for preventative health visit.   Patient has been advised of split billing requirements and indicates understanding: Yes  Healthy Habits:     Getting at least 3 servings of Calcium per day:  Yes    Bi-annual eye exam:  Yes    Dental care twice a year:  Yes    Sleep apnea or symptoms of sleep apnea:  None    Diet:  Regular (no restrictions)    Frequency of exercise:  2-3 days/week    Duration of exercise:  15-30 minutes    Taking medications regularly:  Yes    Medication side effects:  Other    PHQ-2 Total Score: 0    Additional concerns today:  Yes    Moving from parents home to Group Home.  Autism and MARK managed by psychiatry    Today's PHQ-2 Score:   PHQ-2 ( 1999 Pfizer) 11/28/2022   Q1: Little interest or pleasure in doing things 0   Q2: Feeling down, depressed or hopeless 0   PHQ-2 Score 0   PHQ-2 Total Score (12-17 Years)- Positive if 3 or more points; Administer PHQ-A if positive -   Q1: Little interest or pleasure in doing things Not at all   Q2: Feeling down, depressed or hopeless Not at all   PHQ-2 Score 0       Have you ever done Advance Care Planning? (For example, a Health Directive, POLST, or a discussion with a medical provider or your loved ones about your wishes): No, advance care planning information given to patient to review.  Patient plans to discuss their wishes with loved ones or provider.      Social History     Tobacco Use     Smoking status: Never     Smokeless tobacco: Never   Substance Use Topics     Alcohol use: No     If you drink alcohol do you typically have >3 drinks per day or >7 drinks per week? No    Alcohol Use 11/28/2022   Prescreen: >3 drinks/day or >7 drinks/week? No   Prescreen: >3 drinks/day or >7 drinks/week? -       Last PSA: No results found for: PSA    Reviewed orders with patient. Reviewed health maintenance and updated orders accordingly - Yes  Labs reviewed in EPIC    Reviewed and updated as needed this visit by  "clinical staff   Tobacco  Allergies  Meds              Reviewed and updated as needed this visit by Provider                     Review of Systems   Constitutional: Negative for chills and fever.   HENT: Negative for congestion, ear pain, hearing loss and sore throat.    Eyes: Negative for pain and visual disturbance.   Respiratory: Negative for cough and shortness of breath.    Cardiovascular: Negative for chest pain, palpitations and peripheral edema.   Gastrointestinal: Positive for constipation. Negative for abdominal pain, diarrhea, heartburn, hematochezia and nausea.   Genitourinary: Negative for dysuria, frequency, genital sores, hematuria, impotence, penile discharge and urgency.   Musculoskeletal: Negative for arthralgias, joint swelling and myalgias.   Skin: Negative for rash.   Neurological: Negative for dizziness, weakness, headaches and paresthesias.   Psychiatric/Behavioral: Positive for mood changes. The patient is nervous/anxious.          OBJECTIVE:   /77 (BP Location: Left arm, Patient Position: Sitting, Cuff Size: Adult Regular)   Pulse 76   Temp 98.2  F (36.8  C) (Temporal)   Resp 20   Ht 1.956 m (6' 5\")   Wt 77.7 kg (171 lb 3.2 oz)   SpO2 96%   BMI 20.30 kg/m      Physical Exam  GENERAL: healthy, alert and no distress  EYES: Eyes grossly normal to inspection, PERRL and conjunctivae and sclerae normal  HENT: ear canals and TM's normal, nose and mouth without ulcers or lesions  NECK: no adenopathy, no asymmetry, masses, or scars and thyroid normal to palpation  RESP: lungs clear to auscultation - no rales, rhonchi or wheezes  CV: regular rate and rhythm, normal S1 S2, no S3 or S4, no murmur, click or rub, no peripheral edema and peripheral pulses strong  ABDOMEN: soft, nontender, no hepatosplenomegaly, no masses and bowel sounds normal  MS: no gross musculoskeletal defects noted, no edema  SKIN: no suspicious lesions or rashes  NEURO: Normal strength and tone, mentation intact and " speech normal  PSYCH: inattentive and affect normal/bright    Diagnostic Test Results:  Labs reviewed in Epic    ASSESSMENT/PLAN:   (Z00.00) Routine general medical examination at a health care facility  (primary encounter diagnosis)  Comment:   Plan: Routine preventive reviewed    (E03.8) Subclinical hypothyroidism  Comment:   Plan: TSH with free T4 reflex        Check lab    (F84.0) Autism spectrum disorder  Comment:   Plan: Continue per psychiatry    (F41.1) Generalized anxiety disorder  Comment:   Plan: Continue per psychiatry    (Z23) Need for prophylactic vaccination and inoculation against influenza  Comment:   Plan: INFLUENZA VACCINE IM > 6 MONTHS VALENT IIV4         (AFLURIA/FLUZONE)            (Z23) Need for COVID-19 vaccine  Comment:   Plan: COVID-19 VACCINE BIVALENT BOOSTER 18+ (MODERNA)            (Z23) Need for HPV vaccination  Comment:   Plan: Human Papilloma Virus Vaccine (Gardasil 9) 3         Dose IM            (Z11.1) Screening examination for pulmonary tuberculosis  Comment:   Plan: Quantiferon TB Gold Plus                    COUNSELING:   Reviewed preventive health counseling, as reflected in patient instructions        He reports that he has never smoked. He has never used smokeless tobacco.        Ani Pillai MD  [facility]

## 2022-11-28 NOTE — NURSING NOTE
Prior to immunization administration, verified patients identity using patient s name and date of birth. Please see Immunization Activity for additional information.     Screening Questionnaire for Adult Immunization    Are you sick today?   No   Do you have allergies to medications, food, a vaccine component or latex?   No   Have you ever had a serious reaction after receiving a vaccination?   No   Do you have a long-term health problem with heart, lung, kidney, or metabolic disease (e.g., diabetes), asthma, a blood disorder, no spleen, complement component deficiency, a cochlear implant, or a spinal fluid leak?  Are you on long-term aspirin therapy?   No   Do you have cancer, leukemia, HIV/AIDS, or any other immune system problem?   No   Do you have a parent, brother, or sister with an immune system problem?   No   In the past 3 months, have you taken medications that affect  your immune system, such as prednisone, other steroids, or anticancer drugs; drugs for the treatment of rheumatoid arthritis, Crohn s disease, or psoriasis; or have you had radiation treatments?   No   Have you had a seizure, or a brain or other nervous system problem?   No   During the past year, have you received a transfusion of blood or blood    products, or been given immune (gamma) globulin or antiviral drug?   No   For women: Are you pregnant or is there a chance you could become       pregnant during the next month?   No   Have you received any vaccinations in the past 4 weeks?   No     Immunization questionnaire answers were all negative.        Patient instructed to remain in clinic for 15 minutes afterwards, and to report any adverse reaction to me immediately.       Screening performed by Jam Sierra MA on 11/28/2022 at 12:56 PM.

## 2022-11-28 NOTE — PATIENT INSTRUCTIONS
At [facility], we strive to deliver an exceptional experience to you, every time we see you. If you receive a survey, please complete it as we do value your feedback.  If you have MyChart, you can expect to receive results automatically within 24 hours of their completion.  Your provider will send a note interpreting your results as well.   If you do not have MyChart, you should receive your results in about a week by mail.    Your care team:                            Family Medicine Internal Medicine   MD Zohaib Germain MD Shantel Branch-Fleming, MD Srinivasa Vaka, MD Katya Belousova, PAASHLEE Layne CNP, MD ([facility]   Melvin Hatch, MD Nela Collazo MD Amelia Massimini APRN BENNY Dixon APRN MD Eileen Guerra MD          Clinic hours: Monday - Thursday 7 am-6 pm; Fridays 7 am-5 pm.   Urgent care: Monday - Friday 10 am- 8 pm; Saturday and Sunday 9 am-5 pm.    Clinic: [phone]       [facility]: Monday - Thursday 8 am - 7 pm; Friday 8 am - 6 pm  [facility]: [phone]

## 2022-11-30 LAB
GAMMA INTERFERON BACKGROUND BLD IA-ACNC: 0.04 IU/ML
M TB IFN-G BLD-IMP: NEGATIVE
M TB IFN-G CD4+ BCKGRND COR BLD-ACNC: 9.96 IU/ML
MITOGEN IGNF BCKGRD COR BLD-ACNC: -0.01 IU/ML
MITOGEN IGNF BCKGRD COR BLD-ACNC: -0.02 IU/ML
QUANTIFERON MITOGEN: 10 IU/ML
QUANTIFERON NIL TUBE: 0.04 IU/ML
QUANTIFERON TB1 TUBE: 0.03 IU/ML
QUANTIFERON TB2 TUBE: 0.02

## 2022-12-01 NOTE — RESULT ENCOUNTER NOTE
Mr. Hawthorne,    All of your labs were normal for you.    Please contact the clinic if you have additional questions.  Thank you.    Sincerely,    Ani Pillai MD

## 2023-03-19 ENCOUNTER — TRANSFERRED RECORDS (OUTPATIENT)
Dept: HEALTH INFORMATION MANAGEMENT | Facility: CLINIC | Age: 23
End: 2023-03-19

## 2023-03-20 ENCOUNTER — MYC MEDICAL ADVICE (OUTPATIENT)
Dept: FAMILY MEDICINE | Facility: CLINIC | Age: 23
End: 2023-03-20
Payer: COMMERCIAL

## 2023-03-21 NOTE — TELEPHONE ENCOUNTER
Printed attached form and placed in Dr Megan Pillai's box for review when she returns on 3/29/2023. Printed and attached the immunization report. Sent parent a My Chart response regarding form.  [facility]  2nd Floor  Primary Care

## 2023-03-23 ENCOUNTER — TELEPHONE (OUTPATIENT)
Dept: FAMILY MEDICINE | Facility: CLINIC | Age: 23
End: 2023-03-23
Payer: COMMERCIAL

## 2023-03-23 NOTE — TELEPHONE ENCOUNTER
Reason for Call:  Form, our goal is to have forms completed with 72 hours, however, some forms may require a visit or additional information.    Type of letter, form or note:  other    Who is the form from?: group home (if other please explain)    [facility] the [facility].    What clinic location was the form placed at?: [place]    Where the form was placed: box Box/Folder    What number is listed as a contact on the form?: [phone]     Additional comments: na    Call taken on 3/23/2023 at 1:11 PM by Bree Cleary

## 2023-03-27 NOTE — TELEPHONE ENCOUNTER
Received completed form called and LVM informing patients Guardian that for was completed and that this is ready for  at the  for  with ID     Copy placed in TC file and abstracting     Originals placed at

## 2023-05-12 ENCOUNTER — TRANSFERRED RECORDS (OUTPATIENT)
Dept: HEALTH INFORMATION MANAGEMENT | Facility: CLINIC | Age: 23
End: 2023-05-12

## 2023-05-12 ENCOUNTER — MEDICAL CORRESPONDENCE (OUTPATIENT)
Dept: HEALTH INFORMATION MANAGEMENT | Facility: CLINIC | Age: 23
End: 2023-05-12

## 2023-05-12 ENCOUNTER — OFFICE VISIT (OUTPATIENT)
Dept: FAMILY MEDICINE | Facility: CLINIC | Age: 23
End: 2023-05-12
Payer: COMMERCIAL

## 2023-05-12 VITALS
DIASTOLIC BLOOD PRESSURE: 74 MMHG | TEMPERATURE: 97.8 F | BODY MASS INDEX: 23.38 KG/M2 | WEIGHT: 198 LBS | OXYGEN SATURATION: 97 % | HEART RATE: 72 BPM | SYSTOLIC BLOOD PRESSURE: 117 MMHG | RESPIRATION RATE: 16 BRPM | HEIGHT: 77 IN

## 2023-05-12 DIAGNOSIS — K59.01 SLOW TRANSIT CONSTIPATION: Primary | ICD-10-CM

## 2023-05-12 PROCEDURE — 99213 OFFICE O/P EST LOW 20 MIN: CPT | Performed by: FAMILY MEDICINE

## 2023-05-12 RX ORDER — BISACODYL 5 MG/1
5 TABLET, DELAYED RELEASE ORAL DAILY PRN
Qty: 90 TABLET | Refills: 1 | Status: SHIPPED | OUTPATIENT
Start: 2023-05-12 | End: 2023-07-28

## 2023-05-12 RX ORDER — GUANFACINE 2 MG/1
TABLET, EXTENDED RELEASE ORAL
COMMUNITY
Start: 2023-04-17

## 2023-05-12 ASSESSMENT — PAIN SCALES - GENERAL: PAINLEVEL: NO PAIN (0)

## 2023-05-12 NOTE — PROGRESS NOTES
"  Assessment & Plan     Slow transit constipation  Suraj Hawthorne is a 22 year old male who presents with his mom today for concern of chronic constipation.  He currently lives in a group home, his mom is her guardian.  He has bowel movement every 3 days.  Currently taking over-the-counter medication.  Recommended bisacodyl 5 mg at bedtime.  Adequate hydration.  Okay to continue with group home bowel regimen protocol  - bisacodyl (DULCOLAX) 5 MG EC tablet; Take 1 tablet (5 mg) by mouth daily as needed for constipation                 Marisol Moran MD  [facility]   Suraj is a 22 year old, presenting for the following health issues:  Constipation         View : No data to display.              History of Present Illness       Reason for visit:  Constipation/Bowel Treatment Protocol    He eats 0-1 servings of fruits and vegetables daily.He consumes 3 sweetened beverage(s) daily.He exercises with enough effort to increase his heart rate 20 to 29 minutes per day.  He exercises with enough effort to increase his heart rate 4 days per week.   He is taking medications regularly.   Miralx : did not work - cause cramps   Milk of magnesia             Review of Systems   Constitutional, HEENT, cardiovascular, pulmonary, gi and gu systems are negative, except as otherwise noted.      Objective    /74   Pulse 72   Temp 97.8  F (36.6  C) (Tympanic)   Resp 16   Ht 1.956 m (6' 5\")   Wt 89.8 kg (198 lb)   SpO2 97%   BMI 23.48 kg/m    Body mass index is 23.48 kg/m .  Physical Exam  Vitals and nursing note reviewed.   Constitutional:       General: He is not in acute distress.     Appearance: Normal appearance. He is not ill-appearing, toxic-appearing or diaphoretic.   HENT:      Head: Normocephalic and atraumatic.   Neurological:      Mental Status: He is alert.                            "

## 2023-06-19 NOTE — PATIENT INSTRUCTIONS
Preventive Health Recommendations  Male Ages 21 - 25     Yearly exam:             See your health care provider every year in order to  o   Review health changes.   o   Discuss preventive care.    o   Review your medicines if your doctor has prescribed any.    You should be tested each year for STDs (sexually transmitted diseases).     Talk to your provider about cholesterol testing.      If you are at risk for diabetes, you should have a diabetes test (fasting glucose).    Shots: Get a flu shot each year. Get a tetanus shot every 10 years.     Nutrition:    Eat at least 5 servings of fruits and vegetables daily.     Eat whole-grain bread, whole-wheat pasta and brown rice instead of white grains and rice.     Get adequate calcium and Vitamin D.     Lifestyle    Exercise for at least 150 minutes a week (30 minutes a day, 5 days a week). This will help you control your weight and prevent disease.     Limit alcohol to one drink per day.     No smoking.     Wear sunscreen to prevent skin cancer.     See your dentist every six months for an exam and cleaning.     
[phone]

## 2023-07-05 ENCOUNTER — TELEPHONE (OUTPATIENT)
Dept: FAMILY MEDICINE | Facility: CLINIC | Age: 23
End: 2023-07-05
Payer: COMMERCIAL

## 2023-07-05 NOTE — TELEPHONE ENCOUNTER
Reason for Call:  Form, our goal is to have forms completed with 72 hours, however, some forms may require a visit or additional information.    Type of letter, form or note:  medical    Who is the form from?: Home care    Where did the form come from: Patient or family brought in       What clinic location was the form placed at?: [place]    Where the form was placed: Given to MA/RN    What number is listed as a contact on the form?: [phone]       Additional comments:     Call taken on 7/5/2023 at 1:05 PM by Lou Kapoor

## 2023-07-06 ENCOUNTER — MEDICAL CORRESPONDENCE (OUTPATIENT)
Dept: HEALTH INFORMATION MANAGEMENT | Facility: CLINIC | Age: 23
End: 2023-07-06
Payer: COMMERCIAL

## 2023-07-06 NOTE — TELEPHONE ENCOUNTER
Dr Whitaker singed the form. It was brought to the  for . Called and informed Rolando Pearl that this was done.Taylor Kitchen MA/HILAYR

## 2023-07-21 ASSESSMENT — ENCOUNTER SYMPTOMS
WEAKNESS: 0
HEADACHES: 0
HEMATOCHEZIA: 0
SHORTNESS OF BREATH: 0
NERVOUS/ANXIOUS: 1
ABDOMINAL PAIN: 0
NAUSEA: 0
SORE THROAT: 0
DYSURIA: 0
HEARTBURN: 0
CHILLS: 0
FREQUENCY: 0
EYE PAIN: 0
FEVER: 0
CONSTIPATION: 1
ARTHRALGIAS: 0
DIARRHEA: 0
MYALGIAS: 0
DIZZINESS: 0
PALPITATIONS: 0
COUGH: 0
JOINT SWELLING: 0
HEMATURIA: 0
PARESTHESIAS: 0

## 2023-07-28 ENCOUNTER — OFFICE VISIT (OUTPATIENT)
Dept: FAMILY MEDICINE | Facility: CLINIC | Age: 23
End: 2023-07-28
Payer: COMMERCIAL

## 2023-07-28 VITALS
TEMPERATURE: 98.7 F | WEIGHT: 207 LBS | BODY MASS INDEX: 24.44 KG/M2 | HEIGHT: 77 IN | OXYGEN SATURATION: 94 % | SYSTOLIC BLOOD PRESSURE: 128 MMHG | HEART RATE: 74 BPM | RESPIRATION RATE: 16 BRPM | DIASTOLIC BLOOD PRESSURE: 63 MMHG

## 2023-07-28 DIAGNOSIS — Z00.00 ROUTINE GENERAL MEDICAL EXAMINATION AT A HEALTH CARE FACILITY: Primary | ICD-10-CM

## 2023-07-28 DIAGNOSIS — K59.01 SLOW TRANSIT CONSTIPATION: ICD-10-CM

## 2023-07-28 LAB
BASOPHILS # BLD AUTO: 0.1 10E3/UL (ref 0–0.2)
BASOPHILS NFR BLD AUTO: 1 %
EOSINOPHIL # BLD AUTO: 0.1 10E3/UL (ref 0–0.7)
EOSINOPHIL NFR BLD AUTO: 1 %
ERYTHROCYTE [DISTWIDTH] IN BLOOD BY AUTOMATED COUNT: 12.5 % (ref 10–15)
HBA1C MFR BLD: 5.5 % (ref 0–5.6)
HCT VFR BLD AUTO: 47.6 % (ref 40–53)
HGB BLD-MCNC: 15.6 G/DL (ref 13.3–17.7)
IMM GRANULOCYTES # BLD: 0 10E3/UL
IMM GRANULOCYTES NFR BLD: 0 %
LYMPHOCYTES # BLD AUTO: 1.4 10E3/UL (ref 0.8–5.3)
LYMPHOCYTES NFR BLD AUTO: 28 %
MCH RBC QN AUTO: 29.4 PG (ref 26.5–33)
MCHC RBC AUTO-ENTMCNC: 32.8 G/DL (ref 31.5–36.5)
MCV RBC AUTO: 90 FL (ref 78–100)
MONOCYTES # BLD AUTO: 0.3 10E3/UL (ref 0–1.3)
MONOCYTES NFR BLD AUTO: 6 %
NEUTROPHILS # BLD AUTO: 3.3 10E3/UL (ref 1.6–8.3)
NEUTROPHILS NFR BLD AUTO: 64 %
PLATELET # BLD AUTO: 299 10E3/UL (ref 150–450)
RBC # BLD AUTO: 5.31 10E6/UL (ref 4.4–5.9)
WBC # BLD AUTO: 5.2 10E3/UL (ref 4–11)

## 2023-07-28 PROCEDURE — 83735 ASSAY OF MAGNESIUM: CPT | Performed by: FAMILY MEDICINE

## 2023-07-28 PROCEDURE — 83036 HEMOGLOBIN GLYCOSYLATED A1C: CPT | Performed by: FAMILY MEDICINE

## 2023-07-28 PROCEDURE — 36415 COLL VENOUS BLD VENIPUNCTURE: CPT | Performed by: FAMILY MEDICINE

## 2023-07-28 PROCEDURE — 82306 VITAMIN D 25 HYDROXY: CPT | Performed by: FAMILY MEDICINE

## 2023-07-28 PROCEDURE — 80053 COMPREHEN METABOLIC PANEL: CPT | Performed by: FAMILY MEDICINE

## 2023-07-28 PROCEDURE — 84443 ASSAY THYROID STIM HORMONE: CPT | Performed by: FAMILY MEDICINE

## 2023-07-28 PROCEDURE — 85025 COMPLETE CBC W/AUTO DIFF WBC: CPT | Performed by: FAMILY MEDICINE

## 2023-07-28 PROCEDURE — 99395 PREV VISIT EST AGE 18-39: CPT | Performed by: FAMILY MEDICINE

## 2023-07-28 PROCEDURE — 99213 OFFICE O/P EST LOW 20 MIN: CPT | Mod: 25 | Performed by: FAMILY MEDICINE

## 2023-07-28 RX ORDER — POLYETHYLENE GLYCOL 3350 17 G/17G
17 POWDER, FOR SOLUTION ORAL EVERY MORNING
Qty: 850 G | Refills: 3 | Status: SHIPPED | OUTPATIENT
Start: 2023-07-28

## 2023-07-28 RX ORDER — BISACODYL 5 MG/1
5 TABLET, DELAYED RELEASE ORAL AT BEDTIME
Qty: 90 TABLET | Refills: 1 | Status: SHIPPED | OUTPATIENT
Start: 2023-07-28

## 2023-07-28 ASSESSMENT — ENCOUNTER SYMPTOMS
FREQUENCY: 0
DIARRHEA: 0
DYSURIA: 0
PALPITATIONS: 0
NAUSEA: 0
PARESTHESIAS: 0
WEAKNESS: 0
HEMATOCHEZIA: 0
SORE THROAT: 0
NERVOUS/ANXIOUS: 1
FEVER: 0
HEARTBURN: 0
EYE PAIN: 0
DIZZINESS: 0
MYALGIAS: 0
COUGH: 0
ABDOMINAL PAIN: 0
HEADACHES: 0
JOINT SWELLING: 0
ARTHRALGIAS: 0
CONSTIPATION: 1
SHORTNESS OF BREATH: 0
CHILLS: 0
HEMATURIA: 0

## 2023-07-28 ASSESSMENT — PAIN SCALES - GENERAL: PAINLEVEL: NO PAIN (0)

## 2023-07-28 NOTE — PROGRESS NOTES
SUBJECTIVE:   CC: Suraj is an 22 year old who presents for preventative health visit.       7/28/2023    11:15 AM   Additional Questions   Roomed by May   Accompanied by Brody       Healthy Habits:     Getting at least 3 servings of Calcium per day:  Yes    Bi-annual eye exam:  NO    Dental care twice a year:  Yes    Sleep apnea or symptoms of sleep apnea:  None    Diet:  Regular (no restrictions)    Frequency of exercise:  1 day/week    Duration of exercise:  Greater than 60 minutes    Taking medications regularly:  Yes    Medication side effects:  None    Additional concerns today:  Yes    Current bowel regimen:daily Dulcolax 5mg as that produced small to large BMs within 12 to 24 hours.   He takes  that with a teaspoon to 1-1/2 teaspoons CALM magnesium powder (anti-stress formula) without adverse side effects.   The CALM could be used as a PRN  He gets bowel movement twice per week -     Preventive -     Immunization History   Administered Date(s) Administered    COVID-19 Bivalent 18+ (Moderna) 11/28/2022    COVID-19 Monovalent 18+ (Moderna) 04/13/2021, 05/11/2021    COVID-19 Monovalent Booster 18+ (Moderna) 01/05/2022    Comvax (HIB/HepB) 02/10/1999, 04/12/1999, 03/10/2000    DTAP (<7y) 02/10/1999, 04/12/1999, 06/14/1999, 03/10/2000, 08/26/2003    HEPA 10/29/2007, 10/31/2008    HPV9 11/28/2022    Influenza (IIV3) PF 10/29/2005, 12/05/2005    Influenza Vaccine >6 months (Alfuria,Fluzone) 01/18/2018, 02/01/2019, 01/05/2022, 11/28/2022    MMR 03/10/2000, 08/26/2003    Meningococcal ACWY (Menactra ) 12/08/2011, 01/12/2015    Pneumo Conj 13-V (2010&after) 11/18/2010    Poliovirus, inactivated (IPV) 02/10/1999, 04/12/1999, 03/10/2000, 08/26/2003    TDAP Vaccine (Adacel) 11/06/2009, 02/01/2019    Varicella 03/10/2000, 10/29/2007         Social History     Tobacco Use    Smoking status: Never    Smokeless tobacco: Never   Substance Use Topics    Alcohol use: No             7/21/2023     9:39 AM   Alcohol Use    Prescreen: >3 drinks/day or >7 drinks/week? Not Applicable          No data to display                Last PSA: No results found for: PSA    Reviewed orders with patient. Reviewed health maintenance and updated orders accordingly - Yes  Lab work is in process  Labs reviewed in EPIC  BP Readings from Last 3 Encounters:   07/28/23 128/63   05/12/23 117/74   11/28/22 114/77    Wt Readings from Last 3 Encounters:   07/28/23 93.9 kg (207 lb)   05/12/23 89.8 kg (198 lb)   11/28/22 77.7 kg (171 lb 3.2 oz)                  Patient Active Problem List   Diagnosis    Autism spectrum disorder    Generalized anxiety disorder    Vitamin D deficiency    Subclinical hypothyroidism     Past Surgical History:   Procedure Laterality Date    HERNIA REPAIR, INGUINAL RT/LT      L       Social History     Tobacco Use    Smoking status: Never    Smokeless tobacco: Never   Substance Use Topics    Alcohol use: No     Family History   Problem Relation Age of Onset    Allergies Mother     Depression Mother     Eye Disorder Mother     Thyroid Disease Mother         graves    Depression/Anxiety Mother         MARK    Thyroid Disease Mother         Graves    Asthma Mother     Anxiety Disorder Mother     Anesthesia Reaction Mother     Allergies Maternal Grandmother     Arthritis Maternal Grandmother     Gynecology Maternal Grandmother         hyst    Osteoporosis Maternal Grandmother     Arthritis Paternal Grandmother     Depression Paternal Grandmother     Eye Disorder Paternal Grandmother         cataracts    Depression Paternal Grandfather     Thyroid Disease Paternal Grandfather     Diabetes Paternal Grandfather     Hypertension Paternal Grandfather     Diabetes Father     Hypertension Father     Hyperlipidemia Father     Depression Maternal Grandfather     Thyroid Disease Maternal Grandfather          Current Outpatient Medications   Medication Sig Dispense Refill    bisacodyl (DULCOLAX) 5 MG EC tablet Take 1 tablet (5 mg) by mouth At  Bedtime 90 tablet 1    cholecalciferol (VITAMIN D3) 25 mcg (1000 units) capsule Take 25 mcg by mouth daily      guanFACINE (INTUNIV) 2 MG TB24 24 hr tablet       OLANZapine (ZYPREXA) 10 MG tablet Take 10 mg by mouth At Bedtime      OLANZapine (ZYPREXA) 5 MG tablet Take 5 mg by mouth 2 times daily      polyethylene glycol (MIRALAX) 17 GM/Dose powder Take 17 g by mouth every morning 850 g 3    sertraline (ZOLOFT) 50 MG tablet        Allergies   Allergen Reactions    Nkda [No Known Drug Allergy]      Recent Labs   Lab Test 07/28/23  1152 11/28/22  1139 10/15/22  1522 01/05/22  1629 02/17/20  1030   A1C 5.5  --  5.3  --   --    LDL  --   --   --   --  112*   HDL  --   --   --   --  59   TRIG  --   --   --   --  76   ALT  --   --  17  --  19   CR  --   --  1.06  --  1.18   GFRESTIMATED  --   --  >90  --  88   GFRESTBLACK  --   --   --   --  >90   POTASSIUM  --   --  4.6  --  4.2   TSH  --  3.56 2.94   < > 3.67    < > = values in this interval not displayed.        Reviewed and updated as needed this visit by clinical staff     Meds              Reviewed and updated as needed this visit by Provider                 Past Medical History:   Diagnosis Date    Depressive disorder 8/31/2010    Suraj started taking Celexa for MARK.    Pervasive developmental disorder     Subclinical hypothyroidism 5/11/2015      Past Surgical History:   Procedure Laterality Date    HERNIA REPAIR, INGUINAL RT/LT      L       Review of Systems   Constitutional:  Negative for chills and fever.   HENT:  Negative for congestion, ear pain, hearing loss and sore throat.    Eyes:  Negative for pain and visual disturbance.   Respiratory:  Negative for cough and shortness of breath.    Cardiovascular:  Negative for chest pain, palpitations and peripheral edema.   Gastrointestinal:  Positive for constipation. Negative for abdominal pain, diarrhea, heartburn, hematochezia and nausea.   Genitourinary:  Negative for dysuria, frequency, genital sores,  "hematuria, impotence, penile discharge and urgency.   Musculoskeletal:  Negative for arthralgias, joint swelling and myalgias.   Skin:  Negative for rash.   Neurological:  Negative for dizziness, weakness, headaches and paresthesias.   Psychiatric/Behavioral:  Positive for mood changes. The patient is nervous/anxious.          OBJECTIVE:   /63   Pulse 74   Temp 98.7  F (37.1  C) (Tympanic)   Resp 16   Ht 1.956 m (6' 5\")   Wt 93.9 kg (207 lb)   SpO2 94%   BMI 24.55 kg/m      Physical Exam  GENERAL: healthy, alert and no distress  EYES: Eyes grossly normal to inspection, PERRL and conjunctivae and sclerae normal  HENT: ear canals and TM's normal, nose and mouth without ulcers or lesions  NECK: no adenopathy, no asymmetry, masses, or scars and thyroid normal to palpation  RESP: lungs clear to auscultation - no rales, rhonchi or wheezes  CV: regular rate and rhythm, normal S1 S2, no S3 or S4, no murmur, click or rub, no peripheral edema and peripheral pulses strong  ABDOMEN: soft, nontender, no hepatosplenomegaly, no masses and bowel sounds normal  MS: no gross musculoskeletal defects noted, no edema  SKIN: no suspicious lesions or rashes  NEURO: Normal strength and tone, mentation intact and speech normal  PSYCH: mentation appears normal, affect normal/bright        ASSESSMENT/PLAN:   (Z00.00) Routine general medical examination at a health care facility  (primary encounter diagnosis)  Comment: Preventive care reviewed and updated.    Plan: Vitamin D Deficiency, TSH with free T4 reflex,         Comprehensive metabolic panel (BMP + Alb, Alk         Phos, ALT, AST, Total. Bili, TP), Hemoglobin         A1c, CBC with platelets and differential,         Magnesium        (K59.01) Slow transit constipation    Current bowel regimen:daily Dulcolax 5mg as that produced small to large BMs within 12 to 24 hours.   He takes  that with a teaspoon to 1-1/2 teaspoons CALM magnesium powder (anti-stress formula) without " adverse side effects.   The CALM could be used as a PRN  He gets bowel movement twice per week -   - Will add Miralax 17 g in the morning  - Ok to use fiber gummies  daily   - Increase activity as tolerated     Plan: polyethylene glycol (MIRALAX) 17 GM/Dose         powder, bisacodyl (DULCOLAX) 5 MG EC tablet         Patient has been advised of split billing requirements and indicates understanding: Yes      COUNSELING:   Reviewed preventive health counseling, as reflected in patient instructions       Regular exercise       Healthy diet/nutrition        He reports that he has never smoked. He has never used smokeless tobacco.            Marisol Moran MD  [facility]

## 2023-07-29 LAB
ALBUMIN SERPL BCG-MCNC: 5.2 G/DL (ref 3.5–5.2)
ALP SERPL-CCNC: 72 U/L (ref 40–129)
ALT SERPL W P-5'-P-CCNC: 42 U/L (ref 0–70)
ANION GAP SERPL CALCULATED.3IONS-SCNC: 12 MMOL/L (ref 7–15)
AST SERPL W P-5'-P-CCNC: 33 U/L (ref 0–45)
BILIRUB SERPL-MCNC: 0.4 MG/DL
BUN SERPL-MCNC: 10.8 MG/DL (ref 6–20)
CALCIUM SERPL-MCNC: 9.9 MG/DL (ref 8.6–10)
CHLORIDE SERPL-SCNC: 103 MMOL/L (ref 98–107)
CREAT SERPL-MCNC: 1.15 MG/DL (ref 0.67–1.17)
DEPRECATED HCO3 PLAS-SCNC: 24 MMOL/L (ref 22–29)
GFR SERPL CREATININE-BSD FRML MDRD: >90 ML/MIN/1.73M2
GLUCOSE SERPL-MCNC: 100 MG/DL (ref 70–99)
MAGNESIUM SERPL-MCNC: 2.2 MG/DL (ref 1.7–2.3)
POTASSIUM SERPL-SCNC: 4.5 MMOL/L (ref 3.4–5.3)
PROT SERPL-MCNC: 7.4 G/DL (ref 6.4–8.3)
SODIUM SERPL-SCNC: 139 MMOL/L (ref 136–145)
TSH SERPL DL<=0.005 MIU/L-ACNC: 2.92 UIU/ML (ref 0.3–4.2)

## 2023-07-31 LAB — DEPRECATED CALCIDIOL+CALCIFEROL SERPL-MC: 77 UG/L (ref 20–75)

## 2024-08-02 ENCOUNTER — OFFICE VISIT (OUTPATIENT)
Dept: FAMILY MEDICINE | Facility: CLINIC | Age: 24
End: 2024-08-02
Payer: COMMERCIAL

## 2024-08-02 VITALS
WEIGHT: 202 LBS | BODY MASS INDEX: 23.85 KG/M2 | DIASTOLIC BLOOD PRESSURE: 76 MMHG | SYSTOLIC BLOOD PRESSURE: 109 MMHG | RESPIRATION RATE: 16 BRPM | HEART RATE: 62 BPM | OXYGEN SATURATION: 93 % | TEMPERATURE: 98.2 F | HEIGHT: 77 IN

## 2024-08-02 DIAGNOSIS — Z00.00 ROUTINE GENERAL MEDICAL EXAMINATION AT A HEALTH CARE FACILITY: Primary | ICD-10-CM

## 2024-08-02 PROCEDURE — 99395 PREV VISIT EST AGE 18-39: CPT | Performed by: FAMILY MEDICINE

## 2024-08-02 SDOH — HEALTH STABILITY: PHYSICAL HEALTH: ON AVERAGE, HOW MANY DAYS PER WEEK DO YOU ENGAGE IN MODERATE TO STRENUOUS EXERCISE (LIKE A BRISK WALK)?: 3 DAYS

## 2024-08-02 ASSESSMENT — SOCIAL DETERMINANTS OF HEALTH (SDOH): HOW OFTEN DO YOU GET TOGETHER WITH FRIENDS OR RELATIVES?: TWICE A WEEK

## 2024-08-02 ASSESSMENT — PAIN SCALES - GENERAL: PAINLEVEL: NO PAIN (0)

## 2024-08-02 NOTE — PATIENT INSTRUCTIONS
Patient Education   Preventive Care Advice   This is general advice given by our system to help you stay healthy. However, your care team may have specific advice just for you. Please talk to your care team about your preventive care needs.  Nutrition  Eat 5 or more servings of fruits and vegetables each day.  Try wheat bread, brown rice and whole grain pasta (instead of white bread, rice, and pasta).  Get enough calcium and vitamin D. Check the label on foods and aim for 100% of the RDA (recommended daily allowance).  Lifestyle  Exercise at least 150 minutes each week  (30 minutes a day, 5 days a week).  Do muscle strengthening activities 2 days a week. These help control your weight and prevent disease.  No smoking.  Wear sunscreen to prevent skin cancer.  Have a dental exam and cleaning every 6 months.  Yearly exams  See your health care team every year to talk about:  Any changes in your health.  Any medicines your care team has prescribed.  Preventive care, family planning, and ways to prevent chronic diseases.  Shots (vaccines)   HPV shots (up to age 26), if you've never had them before.  Hepatitis B shots (up to age 59), if you've never had them before.  COVID-19 shot: Get this shot when it's due.  Flu shot: Get a flu shot every year.  Tetanus shot: Get a tetanus shot every 10 years.  Pneumococcal, hepatitis A, and RSV shots: Ask your care team if you need these based on your risk.  Shingles shot (for age 50 and up)  General health tests  Diabetes screening:  Starting at age 35, Get screened for diabetes at least every 3 years.  If you are younger than age 35, ask your care team if you should be screened for diabetes.  Cholesterol test: At age 39, start having a cholesterol test every 5 years, or more often if advised.  Bone density scan (DEXA): At age 50, ask your care team if you should have this scan for osteoporosis (brittle bones).  Hepatitis C: Get tested at least once in your life.  STIs (sexually  transmitted infections)  Before age 24: Ask your care team if you should be screened for STIs.  After age 24: Get screened for STIs if you're at risk. You are at risk for STIs (including HIV) if:  You are sexually active with more than one person.  You don't use condoms every time.  You or a partner was diagnosed with a sexually transmitted infection.  If you are at risk for HIV, ask about PrEP medicine to prevent HIV.  Get tested for HIV at least once in your life, whether you are at risk for HIV or not.  Cancer screening tests  Cervical cancer screening: If you have a cervix, begin getting regular cervical cancer screening tests starting at age 21.  Breast cancer scan (mammogram): If you've ever had breasts, begin having regular mammograms starting at age 40. This is a scan to check for breast cancer.  Colon cancer screening: It is important to start screening for colon cancer at age 45.  Have a colonoscopy test every 10 years (or more often if you're at risk) Or, ask your provider about stool tests like a FIT test every year or Cologuard test every 3 years.  To learn more about your testing options, visit:   .  For help making a decision, visit:   https://bit.ly/nq26740.  Prostate cancer screening test: If you have a prostate, ask your care team if a prostate cancer screening test (PSA) at age 55 is right for you.  Lung cancer screening: If you are a current or former smoker ages 50 to 80, ask your care team if ongoing lung cancer screenings are right for you.  For informational purposes only. Not to replace the advice of your health care provider. Copyright   2023 [employer]. All rights reserved. Clinically reviewed by the [facility] Transitions Program. [employer] [id number] - REV 01/24.

## 2024-08-02 NOTE — PROGRESS NOTES
Preventive Care Visit  [facility]  Marisol Moran MD, Family Medicine  Aug 2, 2024      Assessment & Plan     Routine general medical examination at a health care facility  Preventive care reviewed and updated.  Doing well , no current concern             Counseling  Appropriate preventive services were addressed with this patient via screening, questionnaire, or discussion as appropriate for fall prevention, nutrition, physical activity, Tobacco-use cessation, weight loss and cognition.  Checklist reviewing preventive services available has been given to the patient.  Reviewed patient's diet, addressing concerns and/or questions.   He is at risk for lack of exercise and has been provided with information to increase physical activity for the benefit of his well-being.           Lisa Ruelas is a 23 year old, presenting for the following:  Physical        8/2/2024    11:46 AM   Additional Questions   Roomed by May WALKER   Accompanied by mom         8/2/2024   Forms   Any forms needing to be completed Yes           HPI    Current bowel regimen:  - Dulcolax 5mg daily at night time    - Miralax 17 g in the morning  - Ok to use fiber gummies  daily     Preventive -     Immunization History   Administered Date(s) Administered    COVID-19 12+ (2023-24) (MODERNA) 11/13/2023    COVID-19 Bivalent 18+ (Moderna) 11/28/2022    COVID-19 Monovalent 18+ (Moderna) 04/13/2021, 05/11/2021    COVID-19 Monovalent Booster 18+ (Moderna) 01/05/2022    Comvax (HIB/HepB) 02/10/1999, 04/12/1999, 03/10/2000    DTAP (<7y) 02/10/1999, 04/12/1999, 06/14/1999, 03/10/2000, 08/26/2003    HEPA 10/29/2007, 10/31/2008    HPV9 11/28/2022    Influenza (IIV3) PF 10/29/2005, 12/05/2005    Influenza Vaccine >6 months,quad, PF 01/18/2018, 02/01/2019, 01/05/2022, 11/28/2022, 11/13/2023    MMR 03/10/2000, 08/26/2003    Meningococcal ACWY (Menactra ) 12/08/2011, 01/12/2015    Pneumo Conj 13-V (2010&after) 11/18/2010     Poliovirus, inactivated (IPV) 02/10/1999, 04/12/1999, 03/10/2000, 08/26/2003    TDAP Vaccine (Adacel) 11/06/2009, 02/01/2019    Varicella 03/10/2000, 10/29/2007                     8/2/2024   General Health   How would you rate your overall physical health? Excellent   Feel stress (tense, anxious, or unable to sleep) Not at all            8/2/2024   Nutrition   Three or more servings of calcium each day? (!) I DON'T KNOW   Diet: Regular (no restrictions)   How many servings of fruit and vegetables per day? (!) 2-3   How many sweetened beverages each day? (!) 3            8/2/2024   Exercise   Days per week of moderate/strenous exercise 3 days            8/2/2024   Social Factors   Frequency of gathering with friends or relatives Twice a week   Worry food won't last until get money to buy more No   Food not last or not have enough money for food? No   Do you have housing? (Housing is defined as stable permanent housing and does not include staying ouside in a car, in a tent, in an abandoned building, in an overnight shelter, or couch-surfing.) Yes   Are you worried about losing your housing? No   Lack of transportation? No   Unable to get utilities (heat,electricity)? No            8/2/2024   Dental   Dentist two times every year? Yes                 Today's PHQ-2 Score:       8/2/2024    11:41 AM   PHQ-2 ( 1999 Pf[employer])   Q1: Little interest or pleasure in doing things 0   Q2: Feeling down, depressed or hopeless 0   PHQ-2 Score 0   Q1: Little interest or pleasure in doing things Not at all   Q2: Feeling down, depressed or hopeless Not at all   PHQ-2 Score 0         8/2/2024   Substance Use   Alcohol more than 3/day or more than 7/wk Not Applicable   Do you use any other substances recreationally? No        Social History     Tobacco Use    Smoking status: Never    Smokeless tobacco: Never   Vaping Use    Vaping status: Never Used   Substance Use Topics    Alcohol use: No    Drug use: No             8/2/2024   One  time HIV Screening   Previous HIV test? No          8/2/2024   STI Screening   New sexual partner(s) since last STI/HIV test? No            8/2/2024   Contraception/Family Planning   Questions about contraception or family planning No           Reviewed and updated as needed this visit by Provider                    Past Medical History:   Diagnosis Date    Depressive disorder 8/31/2010    Suraj started taking Celexa for MARK.    Pervasive developmental disorder     Subclinical hypothyroidism 5/11/2015     Past Surgical History:   Procedure Laterality Date    HERNIA REPAIR, INGUINAL RT/LT      L     Labs reviewed in EPIC  BP Readings from Last 3 Encounters:   08/02/24 109/76   07/28/23 128/63   05/12/23 117/74    Wt Readings from Last 3 Encounters:   08/02/24 91.6 kg (202 lb)   07/28/23 93.9 kg (207 lb)   05/12/23 89.8 kg (198 lb)                  Patient Active Problem List   Diagnosis    Autism spectrum disorder    Generalized anxiety disorder    Vitamin D deficiency    Subclinical hypothyroidism     Past Surgical History:   Procedure Laterality Date    HERNIA REPAIR, INGUINAL RT/LT      L       Social History     Tobacco Use    Smoking status: Never    Smokeless tobacco: Never   Substance Use Topics    Alcohol use: No     Family History   Problem Relation Age of Onset    Allergies Mother     Depression Mother     Eye Disorder Mother     Thyroid Disease Mother         graves    Depression/Anxiety Mother         MARK    Thyroid Disease Mother         Graves    Asthma Mother     Anxiety Disorder Mother     Anesthesia Reaction Mother     Allergies Maternal Grandmother     Arthritis Maternal Grandmother     Gynecology Maternal Grandmother         hyst    Osteoporosis Maternal Grandmother     Arthritis Paternal Grandmother     Depression Paternal Grandmother     Eye Disorder Paternal Grandmother         cataracts    Depression Paternal Grandfather     Thyroid Disease Paternal Grandfather     Diabetes Paternal  "Grandfather     Hypertension Paternal Grandfather     Diabetes Father     Hypertension Father     Hyperlipidemia Father     Depression Maternal Grandfather     Thyroid Disease Maternal Grandfather          Current Outpatient Medications   Medication Sig Dispense Refill    bisacodyl (DULCOLAX) 5 MG EC tablet Take 1 tablet (5 mg) by mouth At Bedtime 90 tablet 1    cholecalciferol (VITAMIN D3) 25 mcg (1000 units) capsule Take 25 mcg by mouth daily      guanFACINE (INTUNIV) 2 MG TB24 24 hr tablet       OLANZapine (ZYPREXA) 10 MG tablet Take 10 mg by mouth At Bedtime      OLANZapine (ZYPREXA) 5 MG tablet Take 5 mg by mouth 2 times daily      polyethylene glycol (MIRALAX) 17 GM/Dose powder Take 17 g by mouth every morning 850 g 3    sertraline (ZOLOFT) 50 MG tablet        Allergies   Allergen Reactions    Nkda [No Known Drug Allergy]      Recent Labs   Lab Test 07/28/23  1152 11/28/22  1139 10/15/22  1522 01/05/22  1629 02/17/20  1030   A1C 5.5  --  5.3  --   --    LDL  --   --   --   --  112*   HDL  --   --   --   --  59   TRIG  --   --   --   --  76   ALT 42  --  17  --  19   CR 1.15  --  1.06  --  1.18   GFRESTIMATED >90  --  >90  --  88   GFRESTBLACK  --   --   --   --  >90   POTASSIUM 4.5  --  4.6  --  4.2   TSH 2.92 3.56 2.94   < > 3.67    < > = values in this interval not displayed.          Review of Systems  Constitutional, HEENT, cardiovascular, pulmonary, gi and gu systems are negative, except as otherwise noted.     Objective    Exam  /76   Pulse 62   Temp 98.2  F (36.8  C) (Tympanic)   Resp 16   Ht 1.956 m (6' 5\")   Wt 91.6 kg (202 lb)   SpO2 93%   BMI 23.95 kg/m     Estimated body mass index is 23.95 kg/m  as calculated from the following:    Height as of this encounter: 1.956 m (6' 5\").    Weight as of this encounter: 91.6 kg (202 lb).    Physical Exam  GENERAL: alert and no distress  NECK: no adenopathy, no asymmetry, masses, or scars  RESP: lungs clear to auscultation - no rales, rhonchi or " wheezes  CV: regular rate and rhythm, normal S1 S2, no S3 or S4, no murmur, click or rub, no peripheral edema  ABDOMEN: soft, nontender, no hepatosplenomegaly, no masses and bowel sounds normal  MS: no gross musculoskeletal defects noted, no edema        Signed Electronically by: Marisol Moran MD

## 2024-08-30 ENCOUNTER — MEDICAL CORRESPONDENCE (OUTPATIENT)
Dept: HEALTH INFORMATION MANAGEMENT | Facility: CLINIC | Age: 24
End: 2024-08-30

## 2025-08-07 ENCOUNTER — OFFICE VISIT (OUTPATIENT)
Dept: FAMILY MEDICINE | Facility: CLINIC | Age: 25
End: 2025-08-07
Payer: COMMERCIAL

## 2025-08-07 VITALS
RESPIRATION RATE: 16 BRPM | BODY MASS INDEX: 25.46 KG/M2 | HEIGHT: 77 IN | HEART RATE: 70 BPM | DIASTOLIC BLOOD PRESSURE: 76 MMHG | TEMPERATURE: 97.1 F | WEIGHT: 215.6 LBS | SYSTOLIC BLOOD PRESSURE: 109 MMHG

## 2025-08-07 DIAGNOSIS — E03.8 SUBCLINICAL HYPOTHYROIDISM: ICD-10-CM

## 2025-08-07 DIAGNOSIS — E55.9 VITAMIN D DEFICIENCY: ICD-10-CM

## 2025-08-07 DIAGNOSIS — Z00.00 ROUTINE GENERAL MEDICAL EXAMINATION AT A HEALTH CARE FACILITY: Primary | ICD-10-CM

## 2025-08-07 LAB
BASOPHILS # BLD AUTO: 0.1 10E3/UL (ref 0–0.2)
BASOPHILS NFR BLD AUTO: 1 %
EOSINOPHIL # BLD AUTO: 0.1 10E3/UL (ref 0–0.7)
EOSINOPHIL NFR BLD AUTO: 1 %
ERYTHROCYTE [DISTWIDTH] IN BLOOD BY AUTOMATED COUNT: 13.1 % (ref 10–15)
EST. AVERAGE GLUCOSE BLD GHB EST-MCNC: 108 MG/DL
HBA1C MFR BLD: 5.4 % (ref 0–5.6)
HCT VFR BLD AUTO: 43.7 % (ref 40–53)
HGB BLD-MCNC: 14.5 G/DL (ref 13.3–17.7)
IMM GRANULOCYTES # BLD: 0 10E3/UL
IMM GRANULOCYTES NFR BLD: 0 %
LYMPHOCYTES # BLD AUTO: 1.8 10E3/UL (ref 0.8–5.3)
LYMPHOCYTES NFR BLD AUTO: 24 %
MCH RBC QN AUTO: 29.5 PG (ref 26.5–33)
MCHC RBC AUTO-ENTMCNC: 33.2 G/DL (ref 31.5–36.5)
MCV RBC AUTO: 89 FL (ref 78–100)
MONOCYTES # BLD AUTO: 0.6 10E3/UL (ref 0–1.3)
MONOCYTES NFR BLD AUTO: 8 %
NEUTROPHILS # BLD AUTO: 5.2 10E3/UL (ref 1.6–8.3)
NEUTROPHILS NFR BLD AUTO: 67 %
PLATELET # BLD AUTO: 354 10E3/UL (ref 150–450)
RBC # BLD AUTO: 4.92 10E6/UL (ref 4.4–5.9)
WBC # BLD AUTO: 7.8 10E3/UL (ref 4–11)

## 2025-08-07 PROCEDURE — 3050F LDL-C >= 130 MG/DL: CPT | Performed by: FAMILY MEDICINE

## 2025-08-07 PROCEDURE — 85025 COMPLETE CBC W/AUTO DIFF WBC: CPT | Performed by: FAMILY MEDICINE

## 2025-08-07 PROCEDURE — 82306 VITAMIN D 25 HYDROXY: CPT | Performed by: FAMILY MEDICINE

## 2025-08-07 PROCEDURE — 1126F AMNT PAIN NOTED NONE PRSNT: CPT | Performed by: FAMILY MEDICINE

## 2025-08-07 PROCEDURE — 83036 HEMOGLOBIN GLYCOSYLATED A1C: CPT | Performed by: FAMILY MEDICINE

## 2025-08-07 PROCEDURE — 80053 COMPREHEN METABOLIC PANEL: CPT | Performed by: FAMILY MEDICINE

## 2025-08-07 PROCEDURE — 3074F SYST BP LT 130 MM HG: CPT | Performed by: FAMILY MEDICINE

## 2025-08-07 PROCEDURE — 3078F DIAST BP <80 MM HG: CPT | Performed by: FAMILY MEDICINE

## 2025-08-07 PROCEDURE — 80061 LIPID PANEL: CPT | Performed by: FAMILY MEDICINE

## 2025-08-07 PROCEDURE — 36415 COLL VENOUS BLD VENIPUNCTURE: CPT | Performed by: FAMILY MEDICINE

## 2025-08-07 PROCEDURE — 3044F HG A1C LEVEL LT 7.0%: CPT | Performed by: FAMILY MEDICINE

## 2025-08-07 PROCEDURE — 99395 PREV VISIT EST AGE 18-39: CPT | Mod: 25 | Performed by: FAMILY MEDICINE

## 2025-08-07 PROCEDURE — 90471 IMMUNIZATION ADMIN: CPT | Performed by: FAMILY MEDICINE

## 2025-08-07 PROCEDURE — 84443 ASSAY THYROID STIM HORMONE: CPT | Performed by: FAMILY MEDICINE

## 2025-08-07 PROCEDURE — 90651 9VHPV VACCINE 2/3 DOSE IM: CPT | Performed by: FAMILY MEDICINE

## 2025-08-07 SDOH — HEALTH STABILITY: PHYSICAL HEALTH: ON AVERAGE, HOW MANY DAYS PER WEEK DO YOU ENGAGE IN MODERATE TO STRENUOUS EXERCISE (LIKE A BRISK WALK)?: 4 DAYS

## 2025-08-07 ASSESSMENT — PAIN SCALES - GENERAL: PAINLEVEL_OUTOF10: NO PAIN (0)

## 2025-08-07 ASSESSMENT — SOCIAL DETERMINANTS OF HEALTH (SDOH): HOW OFTEN DO YOU GET TOGETHER WITH FRIENDS OR RELATIVES?: ONCE A WEEK

## 2025-08-08 LAB
ALBUMIN SERPL BCG-MCNC: 4.9 G/DL (ref 3.5–5.2)
ALP SERPL-CCNC: 56 U/L (ref 40–150)
ALT SERPL W P-5'-P-CCNC: 19 U/L (ref 0–70)
ANION GAP SERPL CALCULATED.3IONS-SCNC: 11 MMOL/L (ref 7–15)
AST SERPL W P-5'-P-CCNC: 23 U/L (ref 0–45)
BILIRUB SERPL-MCNC: 0.7 MG/DL
BUN SERPL-MCNC: 7.3 MG/DL (ref 6–20)
CALCIUM SERPL-MCNC: 10.2 MG/DL (ref 8.8–10.4)
CHLORIDE SERPL-SCNC: 103 MMOL/L (ref 98–107)
CHOLEST SERPL-MCNC: 219 MG/DL
CREAT SERPL-MCNC: 1.23 MG/DL (ref 0.67–1.17)
EGFRCR SERPLBLD CKD-EPI 2021: 83 ML/MIN/1.73M2
FASTING STATUS PATIENT QL REPORTED: NO
FASTING STATUS PATIENT QL REPORTED: NO
GLUCOSE SERPL-MCNC: 69 MG/DL (ref 70–99)
HCO3 SERPL-SCNC: 26 MMOL/L (ref 22–29)
HDLC SERPL-MCNC: 45 MG/DL
LDLC SERPL CALC-MCNC: 152 MG/DL
NONHDLC SERPL-MCNC: 174 MG/DL
POTASSIUM SERPL-SCNC: 5 MMOL/L (ref 3.4–5.3)
PROT SERPL-MCNC: 7.1 G/DL (ref 6.4–8.3)
SODIUM SERPL-SCNC: 140 MMOL/L (ref 135–145)
TRIGL SERPL-MCNC: 112 MG/DL
TSH SERPL DL<=0.005 MIU/L-ACNC: 3.1 UIU/ML (ref 0.3–4.2)
VIT D+METAB SERPL-MCNC: 84 NG/ML (ref 20–50)

## 2025-08-25 ENCOUNTER — RESULTS FOLLOW-UP (OUTPATIENT)
Dept: FAMILY MEDICINE | Facility: CLINIC | Age: 25
End: 2025-08-25
Payer: COMMERCIAL